# Patient Record
Sex: FEMALE | Race: WHITE | Employment: OTHER | URBAN - METROPOLITAN AREA
[De-identification: names, ages, dates, MRNs, and addresses within clinical notes are randomized per-mention and may not be internally consistent; named-entity substitution may affect disease eponyms.]

---

## 2017-12-12 ENCOUNTER — HOSPITAL ENCOUNTER (EMERGENCY)
Facility: CLINIC | Age: 77
Discharge: HOME | End: 2017-12-12
Attending: PHYSICIAN ASSISTANT | Admitting: PHYSICIAN ASSISTANT
Payer: COMMERCIAL

## 2017-12-12 VITALS — SYSTOLIC BLOOD PRESSURE: 192 MMHG | OXYGEN SATURATION: 97 % | DIASTOLIC BLOOD PRESSURE: 91 MMHG | TEMPERATURE: 98.1 F

## 2017-12-12 DIAGNOSIS — J02.0 STREP THROAT: ICD-10-CM

## 2017-12-12 DIAGNOSIS — Z20.818 EXPOSURE TO STREP THROAT: ICD-10-CM

## 2017-12-12 LAB
INTERNAL QC OK POCT: YES
S PYO AG THROAT QL IA.RAPID: POSITIVE

## 2017-12-12 PROCEDURE — 99213 OFFICE O/P EST LOW 20 MIN: CPT | Performed by: PHYSICIAN ASSISTANT

## 2017-12-12 PROCEDURE — 87880 STREP A ASSAY W/OPTIC: CPT | Performed by: PHYSICIAN ASSISTANT

## 2017-12-12 PROCEDURE — 99213 OFFICE O/P EST LOW 20 MIN: CPT

## 2017-12-12 RX ORDER — PENICILLIN V POTASSIUM 500 MG/1
500 TABLET, FILM COATED ORAL 2 TIMES DAILY
Qty: 20 TABLET | Refills: 0 | Status: SHIPPED | OUTPATIENT
Start: 2017-12-12 | End: 2017-12-22

## 2017-12-12 NOTE — ED AVS SNAPSHOT
[facility]    [address] [address]    Phone:  [phone]    Fax:  [phone]                                       Taina Jones   MRN: [id number]    Department:  [facility]   Date of Visit:  12/12/2017           After Visit Summary Signature Page     I have received my discharge instructions, and my questions have been answered. I have discussed any challenges I see with this plan with the nurse or doctor.    ..........................................................................................................................................  Patient/Patient Representative Signature      ..........................................................................................................................................  Patient Representative Print Name and Relationship to Patient    ..................................................               ................................................  Date                                            Time    ..........................................................................................................................................  Reviewed by Signature/Title    ...................................................              ..............................................  Date                                                            Time

## 2017-12-12 NOTE — ED PROVIDER NOTES
Chief Complaint    Chief Complaint   Patient presents with     Pharyngitis     exp to strep       HPI  Taina Jones is an 77 year old female. presents for evaluation and treatment of sore throat. Symptoms include congestion. Onset 1 day, unchanged since that time. Known Strep exposure: household exposure.  Her grandson was in earlier and tested positive.  Patient visits her 100+ year old mother in the nursing home daily and wants to make sure she does not have strep.    She denies any diarrhea or vomiting.  No chest pain or shortness of breath.   She is eating well with no problems swallowing.  Is urinating regularly.     ROS:  All other systems were reviewed and are negative.     Respiratory History  no history of pneumonia or bronchitis     Problem history  Patient Active Problem List   Diagnosis     Health Care Home        Allergies  Not on File     Smoking History  History   Smoking Status     Never Smoker   Smokeless Tobacco     Never Used        Current Meds  Medications reviewed in EMR    OBJECTIVE     Vital signs noted and reviewed by John Wiley  BP (!) 192/91  Temp 98.1  F (36.7  C) (Oral)  SpO2 97%     PEFR:  General appearance: healthy, alert and no distress  Ears: R TM - normal: no effusions, no erythema, and normal landmarks, L TM - normal: no effusions, no erythema, and normal landmarks  Eyes: R normal, L normal  Nose: normal  Oropharynx: moderate erythema  Neck: supple and no adenopathy  Lungs: normal and clear to auscultation  Heart: S1, S2 normal, no murmur, click, rub or gallop, regular rate and rhythm, chest is clear without rales or wheezing, no pedal edema, no JVD, no hepatosplenomegaly  Abdomen: Abdomen soft, non-tender without masses or organomegaly        Labs:     Results for orders placed or performed during the hospital encounter of 12/12/17   Rapid strep group A screen POCT   Result Value Ref Range    Rapid Strep A Screen POSITIVE neg    Internal QC OK Yes            ASSESSMENT:     (Z20.818) Exposure to strep throat    (J02.0) Strep throat  Plan: penicillin V potassium (VEETID) 500 MG tablet           PLAN:    Strep screen was positive and communicated to patient.  Rx for Penicillin today.    Symptomatic treatment with fluids, vaporizer, acetaminophen,salt water gargles, and ibuprofen.  Follow up with PCP as needed for persistence, worsening, appearance of new symptoms.  Contagion reviewed.  Out of work/school until feeling better, or no fever without antipyretics for 24 hours.        John Wiley  12/12/2017, 3:16 PM       John Wiley PA-C  12/12/17 1529

## 2017-12-12 NOTE — DISCHARGE INSTRUCTIONS
Pharyngitis: Strep (Confirmed)    You have had a positive test for strep throat. Strep throat is a contagious illness. It is spread by coughing, kissing or by touching others after touching your mouth or nose. Symptoms include throat pain that is worse with swallowing, aching all over, headache, and fever. It is treated with antibiotic medicine. This should help you start to feel better in 1 to 2 days.  Home care    Rest at home. Drink plenty of fluids to you won't get dehydrated.    No work or school for the first 2 days of taking the antibiotics. After this time, you will not be contagious. You can then return to school or work if you are feeling better.     Take antibiotic medicine for the full 10 days, even if you feel better. This is very important to ensure the infection is treated. It is also important to prevent medicine-resistant germs from developing. If you were given an antibiotic shot, you don't need any more antibiotics.    You may use acetaminophen or ibuprofen to control pain or fever, unless another medicine was prescribed for this. Talk with your doctor before taking these medicines if you have chronic liver or kidney disease. Also talk with your doctor if you have had a stomach ulcer or GI bleeding.    Throat lozenges or sprays help reduce pain. Gargling with warm saltwater will also reduce throat pain. Dissolve 1/2 teaspoon of salt in 1 glass of warm water. This may be useful just before meals.     Soft foods are OK. Avoid salty or spicy foods.  Follow-up care  Follow up with your healthcare provider or our staff if you don't get better over the next week.  When to seek medical advice  Call your healthcare provider right away if any of these occur:    Fever of 100.4 F (38 C) or higher, or as directed by your healthcare provider    New or worsening ear pain, sinus pain, or headache    Painful lumps in the back of neck    Stiff neck    Lymph nodes getting larger or becoming soft in the  middle    You can't swallow liquids or you can't open your mouth wide because of throat pain    Signs of dehydration. These include very dark urine or no urine, sunken eyes, and dizziness.    Trouble breathing or noisy breathing    Muffled voice    Rash  Date Last Reviewed: 4/13/2015 2000-2017 [employer]. [address] [place] [address], [place] [address]. All rights reserved. This information is not intended as a substitute for professional medical care. Always follow your healthcare professional's instructions.

## 2017-12-12 NOTE — ED AVS SNAPSHOT
[facility] Emergency Department    [address] [address]    Phone:  [phone]    Fax:  [phone]                                       Taina Jones   MRN: [id number]    Department:  [facility] Emergency Department   Date of Visit:  12/12/2017           Patient Information     Date Of Birth          1940        Your diagnoses for this visit were:     Exposure to strep throat     Strep throat        You were seen by John Wiley PA-C.        Discharge Instructions         Pharyngitis: Strep (Confirmed)    You have had a positive test for strep throat. Strep throat is a contagious illness. It is spread by coughing, kissing or by touching others after touching your mouth or nose. Symptoms include throat pain that is worse with swallowing, aching all over, headache, and fever. It is treated with antibiotic medicine. This should help you start to feel better in 1 to 2 days.  Home care    Rest at home. Drink plenty of fluids to you won't get dehydrated.    No work or school for the first 2 days of taking the antibiotics. After this time, you will not be contagious. You can then return to school or work if you are feeling better.     Take antibiotic medicine for the full 10 days, even if you feel better. This is very important to ensure the infection is treated. It is also important to prevent medicine-resistant germs from developing. If you were given an antibiotic shot, you don't need any more antibiotics.    You may use acetaminophen or ibuprofen to control pain or fever, unless another medicine was prescribed for this. Talk with your doctor before taking these medicines if you have chronic liver or kidney disease. Also talk with your doctor if you have had a stomach ulcer or GI bleeding.    Throat lozenges or sprays help reduce pain. Gargling with warm saltwater will also reduce throat pain. Dissolve 1/2 teaspoon of salt in 1 glass of warm water. This may be useful  just before meals.     Soft foods are OK. Avoid salty or spicy foods.  Follow-up care  Follow up with your healthcare provider or our staff if you don't get better over the next week.  When to seek medical advice  Call your healthcare provider right away if any of these occur:    Fever of 100.4 F (38 C) or higher, or as directed by your healthcare provider    New or worsening ear pain, sinus pain, or headache    Painful lumps in the back of neck    Stiff neck    Lymph nodes getting larger or becoming soft in the middle    You can't swallow liquids or you can't open your mouth wide because of throat pain    Signs of dehydration. These include very dark urine or no urine, sunken eyes, and dizziness.    Trouble breathing or noisy breathing    Muffled voice    Rash  Date Last Reviewed: 4/13/2015 2000-2017 [employer]. [address] [place] [address]. All rights reserved. This information is not intended as a substitute for professional medical care. Always follow your healthcare professional's instructions.          24 Hour Appointment Hotline       To make an appointment at any [facility], call [phone] ([phone]). If you don't have a family doctor or clinic, we will help you find one. [facility] are conveniently located to serve the needs of you and your family.             Review of your medicines      START taking        Dose / Directions Last dose taken    penicillin V potassium 500 MG tablet   Commonly known as:  VEETID   Dose:  500 mg   Quantity:  20 tablet        Take 1 tablet (500 mg) by mouth 2 times daily for 10 days   Refills:  0                Prescriptions were sent or printed at these locations (1 Prescription)                   [facility] [address] [address]    Telephone:  [phone]   Fax:  [phone]   Hours:                  E-Prescribed (1 of 1)         penicillin V potassium  "(VEETID) 500 MG tablet                Procedures and tests performed during your visit     Rapid strep group A screen POCT      Orders Needing Specimen Collection     None      Pending Results     No orders found from 12/10/2017 to 2017.            Pending Culture Results     No orders found from 12/10/2017 to 2017.            Pending Results Instructions     If you had any lab results that were not finalized at the time of your Discharge, you can call the ED Lab Result RN at [phone]. You will be contacted by this team for any positive Lab results or changes in treatment. The nurses are available 7 days a week from 10A to 6:30P.  You can leave a message 24 hours per day and they will return your call.        Test Results From Your Hospital Stay        2017  3:27 PM      Component Results     Component Value Ref Range & Units Status    Rapid Strep A Screen POSITIVE neg Final    Internal QC OK Yes  Final                Thank you for choosing [facility]       Thank you for choosing [facility] for your care. Our goal is always to provide you with excellent care. Hearing back from our patients is one way we can continue to improve our services. Please take a few minutes to complete the written survey that you may receive in the mail after you visit with us. Thank you!        [employer] Information     [employer] lets you send messages to your doctor, view your test results, renew your prescriptions, schedule appointments and more. To sign up, go to [email or web address] . Click on \"Log in\" on the left side of the screen, which will take you to the Welcome page. Then click on \"Sign up Now\" on the right side of the page.     You will be asked to enter the access code listed below, as well as some personal information. Please follow the directions to create your username and password.     Your access code is: [email or web address]  Expires: 3/12/2018  3:39 PM     Your access code will  in 90 days. If you need " help or a new code, please call your [facility] clinic or [phone].        Care EveryWhere ID     This is your Care EveryWhere ID. This could be used by other organizations to access your [facility] medical records  [id number]        Equal Access to Services     PETER ELIZABETH : Eileen Colby, fernanda brink, yuridia kaalcasey singh, lavell houston. So [facility] [phone].    ATENCIÓN: Si habla español, tiene a quintana disposición servicios gratuitos de asistencia lingüística. Llame al [phone].    We comply with applicable federal civil rights laws and [place] laws. We do not discriminate on the basis of race, color, national origin, age, disability, sex, sexual orientation, or gender identity.            After Visit Summary       This is your record. Keep this with you and show to your community pharmacist(s) and doctor(s) at your next visit.

## 2022-09-21 ENCOUNTER — OFFICE VISIT (OUTPATIENT)
Dept: FAMILY MEDICINE | Facility: CLINIC | Age: 82
End: 2022-09-21
Payer: COMMERCIAL

## 2022-09-21 VITALS
WEIGHT: 113.8 LBS | BODY MASS INDEX: 21.49 KG/M2 | DIASTOLIC BLOOD PRESSURE: 52 MMHG | HEART RATE: 75 BPM | OXYGEN SATURATION: 99 % | TEMPERATURE: 98.6 F | RESPIRATION RATE: 16 BRPM | HEIGHT: 61 IN | SYSTOLIC BLOOD PRESSURE: 182 MMHG

## 2022-09-21 DIAGNOSIS — I10 BENIGN ESSENTIAL HYPERTENSION: ICD-10-CM

## 2022-09-21 DIAGNOSIS — Z01.818 PREOP GENERAL PHYSICAL EXAM: Primary | ICD-10-CM

## 2022-09-21 DIAGNOSIS — H26.9 CATARACT OF BOTH EYES, UNSPECIFIED CATARACT TYPE: ICD-10-CM

## 2022-09-21 LAB
ANION GAP SERPL CALCULATED.3IONS-SCNC: 9 MMOL/L (ref 7–15)
BUN SERPL-MCNC: 23.8 MG/DL (ref 8–23)
CALCIUM SERPL-MCNC: 9 MG/DL (ref 8.8–10.2)
CHLORIDE SERPL-SCNC: 105 MMOL/L (ref 98–107)
CREAT SERPL-MCNC: 0.84 MG/DL (ref 0.51–0.95)
DEPRECATED HCO3 PLAS-SCNC: 26 MMOL/L (ref 22–29)
GFR SERPL CREATININE-BSD FRML MDRD: 69 ML/MIN/1.73M2
GLUCOSE SERPL-MCNC: 94 MG/DL (ref 70–99)
POTASSIUM SERPL-SCNC: 4.1 MMOL/L (ref 3.4–5.3)
SODIUM SERPL-SCNC: 140 MMOL/L (ref 136–145)
TSH SERPL DL<=0.005 MIU/L-ACNC: 0.9 UIU/ML (ref 0.3–4.2)

## 2022-09-21 PROCEDURE — 99204 OFFICE O/P NEW MOD 45 MIN: CPT | Performed by: FAMILY MEDICINE

## 2022-09-21 PROCEDURE — 36415 COLL VENOUS BLD VENIPUNCTURE: CPT | Performed by: FAMILY MEDICINE

## 2022-09-21 PROCEDURE — 84443 ASSAY THYROID STIM HORMONE: CPT | Performed by: FAMILY MEDICINE

## 2022-09-21 PROCEDURE — 80048 BASIC METABOLIC PNL TOTAL CA: CPT | Performed by: FAMILY MEDICINE

## 2022-09-21 RX ORDER — LISINOPRIL AND HYDROCHLOROTHIAZIDE 20; 25 MG/1; MG/1
1 TABLET ORAL DAILY
Qty: 30 TABLET | Refills: 1 | Status: SHIPPED | OUTPATIENT
Start: 2022-09-21 | End: 2022-09-26

## 2022-09-21 NOTE — PROGRESS NOTES
[facility]  [address] [address]  Phone: [phone]  Primary Provider: No Ref-Primary, Physician  Pre-op Performing Provider: ISAC FAROOQ    6}  PREOPERATIVE EVALUATION:  Today's date: 9/21/2022    Taina Jones is a 82 year old female who presents for a preoperative evaluation.    Surgical Information:  Surgery/Procedure: Phacoemulsification/Intraocular Lens, right  [facility]  Surgeon: Dr. Dejesus  Surgery Date: 9/28/22, 10/12/22  Time of Surgery: TBD  Where patient plans to recover: At home with family  Fax number for surgical facility: [phone]    Type of Anesthesia Anticipated: to be determined    Assessment & Plan     The proposed surgical procedure is considered LOW risk.    Preop general physical exam  Approval given - Blood pressure improved after medication was started.     Benign essential hypertension  Blood pressure quite elevated. Recommend recheck in a few days. Medication started on patient.  - lisinopril-hydrochlorothiazide (ZESTORETIC) 20-25 MG tablet; Take 1 tablet by mouth daily  - Basic metabolic panel  (Ca, Cl, CO2, Creat, Gluc, K, Na, BUN)  - TSH with free T4 reflex    Cataract of both eyes, unspecified cataract type  Patient cleared for surgery.          Risks and Recommendations:  The patient has the following additional risks and recommendations for perioperative complications:   - No identified additional risk factors other than previously addressed    Medication Instructions:  Patient is on no chronic medications    RECOMMENDATION:  APPROVAL Given  - blood pressure was quite elevated.   Needs to come back for blood pressure recheck.    Patient came in for blood pressure recheck on 9/26/2022  Blood pressure improved with medication.   Spoke with ophthalmologist who said the blood pressure is okay to proceed with surgery  Cleared for surgery.     15  minutes spent on  the date of the encounter doing patient visit and documentation         Subjective     HPI related to upcoming procedure:   Patient is an 82 yr old female here for a pre op evaluation. She is having cataract surgery in the next few weeks. She has not really received any medical care for a number of years. She states that she is not aware of any chronic medical conditions that she may have.  Patient's blood pressure was quite elevated today and it appears that she may have had hypertension for a while. She denies any chest pain or shortness of breath.     Preop Questions 9/21/2022   1. Have you ever had a heart attack or stroke? No   2. Have you ever had surgery on your heart or blood vessels, such as a stent placement, a coronary artery bypass, or surgery on an artery in your head, neck, heart, or legs? No   3. Do you have chest pain with activity? No   4. Do you have a history of  heart failure? No   5. Do you currently have a cold, bronchitis or symptoms of other infection? No   6. Do you have a cough, shortness of breath, or wheezing? No   7. Do you or anyone in your family have previous history of blood clots? No   8. Do you or does anyone in your family have a serious bleeding problem such as prolonged bleeding following surgeries or cuts? No   9. Have you ever had problems with anemia or been told to take iron pills? No   10. Have you had any abnormal blood loss such as black, tarry or bloody stools, or abnormal vaginal bleeding? No   11. Have you ever had a blood transfusion? UNKNOWN -    12. Are you willing to have a blood transfusion if it is medically needed before, during, or after your surgery? Yes   13. Have you or any of your relatives ever had problems with anesthesia? No   14. Do you have sleep apnea, excessive snoring or daytime drowsiness? No   15. Do you have any artifical heart valves or other implanted medical devices like a pacemaker, defibrillator, or continuous glucose monitor? No   16. Do  you have artificial joints? No   17. Are you allergic to latex? No       Health Care Directive:  Patient does not have a Health Care Directive or Living Will: Discussed advance care planning with patient; however, patient declined at this time.    Preoperative Review of :   reviewed - no record of controlled substances prescribed.    Status of Chronic Conditions:  See problem list for active medical problems.  Problems all longstanding and stable, except as noted/documented.  See ROS for pertinent symptoms related to these conditions.      Review of Systems  CONSTITUTIONAL: NEGATIVE for fever, chills, change in weight  INTEGUMENTARY/SKIN: NEGATIVE for worrisome rashes, moles or lesions  EYES: POSITIVE for blurred vision bilateral  ENT/MOUTH: NEGATIVE for ear, mouth and throat problems  RESP: NEGATIVE for significant cough or SOB  CV: NEGATIVE for chest pain, palpitations or peripheral edema  GI: NEGATIVE for nausea, abdominal pain, heartburn, or change in bowel habits  : NEGATIVE for frequency, dysuria, or hematuria  MUSCULOSKELETAL: NEGATIVE for significant arthralgias or myalgia  NEURO: NEGATIVE for weakness, dizziness or paresthesias  ENDOCRINE: NEGATIVE for temperature intolerance, skin/hair changes  HEME: NEGATIVE for bleeding problems  PSYCHIATRIC: NEGATIVE for changes in mood or affect    Patient Active Problem List    Diagnosis Date Noted     Health Care Home 12/17/2012     Priority: Medium     Alyssa Houston RN-PHN  FPA / [facility]   [phone]    DX V65.8 REPLACED WITH [id number] HEALTH CARE HOME (04/08/2013)        History reviewed. No pertinent past medical history.  History reviewed. No pertinent surgical history.  Current Outpatient Medications   Medication Sig Dispense Refill     Aspirin-Acetaminophen-Caffeine (EXCEDRIN PO)        lisinopril-hydrochlorothiazide (ZESTORETIC) 20-25 MG tablet Take 1 tablet by mouth daily 30 tablet 1     Triprolidine-Pseudoephedrine (ACTIFED  "PO)          No Known Allergies     Social History     Tobacco Use     Smoking status: Former Smoker     Smokeless tobacco: Never Used   Substance Use Topics     Alcohol use: Yes     Comment: occasional 4-5 drinks per month     Family History   Problem Relation Age of Onset     Liver Disease Father      Substance Abuse Maternal Grandfather      Cancer Paternal Grandmother      Cancer Paternal Grandfather      Cancer Brother      Diabetes Sister      Lupus Sister      Breast Cancer Daughter      History   Drug Use No         Objective     BP (!) 182/52   Pulse 75   Temp 98.6  F (37  C) (Tympanic)   Resp 16   Ht 1.549 m (5' 1\")   Wt 51.6 kg (113 lb 12.8 oz)   SpO2 99%   BMI 21.50 kg/m      Physical Exam    GENERAL APPEARANCE: healthy, alert and no distress     EYES: EOMI, PERRL     HENT: ear canals and TM's normal and nose and mouth without ulcers or lesions     NECK: no adenopathy, no asymmetry, masses, or scars and thyroid normal to palpation     RESP: lungs clear to auscultation - no rales, rhonchi or wheezes     CV: regular rates and rhythm, normal S1 S2, no S3 or S4 and no murmur, click or rub     ABDOMEN:  soft, nontender, no HSM or masses and bowel sounds normal     MS: extremities normal- no gross deformities noted, no evidence of inflammation in joints, FROM in all extremities.     SKIN: no suspicious lesions or rashes     PSYCH: mentation appears normal. and affect normal/bright     LYMPHATICS: No cervical adenopathy    No results for input(s): HGB, PLT, INR, NA, POTASSIUM, CR, A1C in the last 21348 hours.     Diagnostics:  Labs pending at this time.  Results will be reviewed when available.   No EKG required for low risk surgery (cataract, skin procedure, breast biopsy, etc).    Revised Cardiac Risk Index (RCRI):  The patient has the following serious cardiovascular risks for perioperative complications:   - No serious cardiac risks = 0 points     RCRI Interpretation: 0 points: Class I (very low risk " - 0.4% complication rate)           Signed Electronically by: Glen Santamaria MD  Copy of this evaluation report is provided to requesting physician.

## 2022-09-21 NOTE — PATIENT INSTRUCTIONS
Preparing for Your Surgery  Getting started  A nurse will call you to review your health history and instructions. They will give you an arrival time based on your scheduled surgery time. Please be ready to share:    Your doctor's clinic name and phone number    Your medical, surgical and anesthesia history    A list of allergies and sensitivities    A list of medicines, including herbal treatments and over-the-counter drugs    Whether the patient has a legal guardian (ask how to send us the papers in advance)  Please tell us if you're pregnant--or if there's any chance you might be pregnant. Some surgeries may injure a fetus (unborn baby), so they require a pregnancy test. Surgeries that are safe for a fetus don't always need a test, and you can choose whether to have one.   If you have a child who's having surgery, please ask for a copy of Preparing for Your Child's Surgery.    Preparing for surgery    Within 30 days of surgery: Have a pre-op exam (sometimes called an H&P, or History and Physical). This can be done at a clinic or pre-operative center.  ? If you're having a , you may not need this exam. Talk to your care team.    At your pre-op exam, talk to your care team about all medicines you take. If you need to stop any medicines before surgery, ask when to start taking them again.  ? We do this for your safety. Many medicines can make you bleed too much during surgery. Some change how well surgery (anesthesia) drugs work.    Call your insurance company to let them know you're having surgery. (If you don't have insurance, call [phone].)    Call your clinic if there's any change in your health. This includes signs of a cold or flu (sore throat, runny nose, cough, rash, fever). It also includes a scrape or scratch near the surgery site.    If you have questions on the day of surgery, call your hospital or surgery center.  COVID testing  You may need to be tested for COVID-19 before having  surgery. If so, we will give you instructions.  Eating and drinking guidelines  For your safety: Unless your surgeon tells you otherwise, follow the guidelines below.    Eat and drink as usual until 8 hours before surgery. After that, no food or milk.    Drink clear liquids until 2 hours before surgery. These are liquids you can see through, like water, Gatorade and Propel Water. You may also have black coffee and tea (no cream or milk).    Nothing by mouth within 2 hours of surgery. This includes gum, candy and breath mints.    If you drink alcohol: Stop drinking it the night before surgery.    If your care team tells you to take medicine on the morning of surgery, it's okay to take it with a sip of water.  Preventing infection    Shower or bathe the night before and morning of your surgery. Follow the instructions your clinic gave you. (If no instructions, use regular soap.)    Don't shave or clip hair near your surgery site. We'll remove the hair if needed.    Don't smoke or vape the morning of surgery. You may chew nicotine gum up to 2 hours before surgery. A nicotine patch is okay.  ? Note: Some surgeries require you to completely quit smoking and nicotine. Check with your surgeon.    Your care team will make every effort to keep you safe from infection. We will:  ? Clean our hands often with soap and water (or an alcohol-based hand rub).  ? Clean the skin at your surgery site with a special soap that kills germs.  ? Give you a special gown to keep you warm. (Cold raises the risk of infection.)  ? Wear special hair covers, masks, gowns and gloves during surgery.  ? Give antibiotic medicine, if prescribed. Not all surgeries need antibiotics.  What to bring on the day of surgery    Photo ID and insurance card    Copy of your health care directive, if you have one    Glasses and hearing aides (bring cases)  ? You can't wear contacts during surgery    Inhaler and eye drops, if you use them (tell us about these when  you arrive)    CPAP machine or breathing device, if you use them    A few personal items, if spending the night    If you have . . .  ? A pacemaker, ICD (cardiac defibrillator) or other implant: Bring the ID card.  ? An implanted stimulator: Bring the remote control.  ? A legal guardian: Bring a copy of the certified (court-stamped) guardianship papers.  Please remove any jewelry, including body piercings. Leave jewelry and other valuables at home.  If you're going home the day of surgery    You must have a responsible adult drive you home. They should stay with you overnight as well.    If you don't have someone to stay with you, and you aren't safe to go home alone, we may keep you overnight. Insurance often won't pay for this.  After surgery  If it's hard to control your pain or you need more pain medicine, please call your surgeon's office.  Questions?   If you have any questions for your care team, list them here: _________________________________________________________________________________________________________________________________________________________________________ ____________________________________ ____________________________________ ____________________________________  For informational purposes only. Not to replace the advice of your health care provider. Copyright   2003, 2019 [facility]. All rights reserved. Clinically reviewed by Lucero Fritz MD. [employer] [id number] - REV 07/21.

## 2022-09-21 NOTE — RESULT ENCOUNTER NOTE
Please inform patient that test result was within normal parameters.   Thank you.     Glen Santamaria M.D.

## 2022-09-26 ENCOUNTER — OFFICE VISIT (OUTPATIENT)
Dept: FAMILY MEDICINE | Facility: CLINIC | Age: 82
End: 2022-09-26
Payer: COMMERCIAL

## 2022-09-26 VITALS
RESPIRATION RATE: 16 BRPM | TEMPERATURE: 98.3 F | BODY MASS INDEX: 21.39 KG/M2 | SYSTOLIC BLOOD PRESSURE: 142 MMHG | HEART RATE: 72 BPM | WEIGHT: 113.2 LBS | DIASTOLIC BLOOD PRESSURE: 60 MMHG

## 2022-09-26 DIAGNOSIS — I10 BENIGN ESSENTIAL HYPERTENSION: ICD-10-CM

## 2022-09-26 PROCEDURE — 99213 OFFICE O/P EST LOW 20 MIN: CPT | Performed by: FAMILY MEDICINE

## 2022-09-26 RX ORDER — LISINOPRIL AND HYDROCHLOROTHIAZIDE 20; 25 MG/1; MG/1
2 TABLET ORAL DAILY
Qty: 180 TABLET | Refills: 0 | Status: SHIPPED | OUTPATIENT
Start: 2022-09-26 | End: 2022-11-28

## 2022-09-26 ASSESSMENT — PAIN SCALES - GENERAL: PAINLEVEL: NO PAIN (0)

## 2022-09-26 NOTE — PROGRESS NOTES
Assessment & Plan     Benign essential hypertension  Medication refilled. Asked to increase to two a day. Follow up in two weeks for a recheck  - lisinopril-hydrochlorothiazide (ZESTORETIC) 20-25 MG tablet; Take 2 tablets by mouth daily for 90 days    :[id number]}     FUTURE APPOINTMENTS:       - Follow-up visit in 2 weeks for BP recheck.    Return in about 2 weeks (around 10/10/2022) for Follow up.    Glen Santamaria MD  [facility]    South Her is a 82 year old accompanied by her self, presenting for the following health issues:    82 yr old female here for recheck of hypertension. Was seen for a pre op last week and was found to have elevated blood pressure. She was started on medication at the time and she says she has has no side effects to the medication. Her blood pressure is still slightly high. Called her surgeon and he reports that she can proceed with her surgery   Recheck Medication      History of Present Illness       Hypertension: She presents for follow up of hypertension.  She does not check blood pressure  regularly outside of the clinic. Outside blood pressures have been over 140/90. She does not follow a low salt diet.           Review of Systems   CONSTITUTIONAL: NEGATIVE for fever, chills, change in weight  INTEGUMENTARY/SKIN: NEGATIVE for worrisome rashes, moles or lesions  ENT/MOUTH: NEGATIVE for ear, mouth and throat problems  RESP: NEGATIVE for significant cough or SOB  CV: NEGATIVE for chest pain, palpitations or peripheral edema  GI: NEGATIVE for nausea, abdominal pain, heartburn, or change in bowel habits  NEURO: NEGATIVE for weakness, dizziness or paresthesias  HEME/ALLERGY/IMMUNE: NEGATIVE for bleeding problems  PSYCHIATRIC: NEGATIVE for changes in mood or affect      Objective    BP (!) 142/60   Pulse 72   Temp 98.3  F (36.8  C) (Tympanic)   Resp 16   Wt 51.3 kg (113 lb 3.2 oz)   BMI 21.39 kg/m    Body mass index is 21.39 kg/m .  Physical  Exam   GENERAL: healthy, alert and no distress  EYES: Eyes grossly normal to inspection, PERRL and conjunctivae and sclerae normal  HENT: ear canals and TM's normal, nose and mouth without ulcers or lesions  NECK: no adenopathy, no asymmetry, masses, or scars and thyroid normal to palpation  RESP: lungs clear to auscultation - no rales, rhonchi or wheezes  CV: regular rate and rhythm, normal S1 S2, no S3 or S4, no murmur, click or rub, no peripheral edema and peripheral pulses strong  ABDOMEN: soft, nontender, no hepatosplenomegaly, no masses and bowel sounds normal  MS: no gross musculoskeletal defects noted, no edema

## 2022-11-28 ENCOUNTER — OFFICE VISIT (OUTPATIENT)
Dept: FAMILY MEDICINE | Facility: CLINIC | Age: 82
End: 2022-11-28
Payer: COMMERCIAL

## 2022-11-28 VITALS
RESPIRATION RATE: 16 BRPM | BODY MASS INDEX: 20.92 KG/M2 | TEMPERATURE: 98.5 F | SYSTOLIC BLOOD PRESSURE: 150 MMHG | HEIGHT: 61 IN | OXYGEN SATURATION: 99 % | HEART RATE: 85 BPM | WEIGHT: 110.8 LBS | DIASTOLIC BLOOD PRESSURE: 70 MMHG

## 2022-11-28 DIAGNOSIS — I10 BENIGN ESSENTIAL HYPERTENSION: ICD-10-CM

## 2022-11-28 PROCEDURE — 93000 ELECTROCARDIOGRAM COMPLETE: CPT | Performed by: STUDENT IN AN ORGANIZED HEALTH CARE EDUCATION/TRAINING PROGRAM

## 2022-11-28 PROCEDURE — 99214 OFFICE O/P EST MOD 30 MIN: CPT | Performed by: STUDENT IN AN ORGANIZED HEALTH CARE EDUCATION/TRAINING PROGRAM

## 2022-11-28 RX ORDER — LISINOPRIL AND HYDROCHLOROTHIAZIDE 20; 25 MG/1; MG/1
2 TABLET ORAL DAILY
Qty: 180 TABLET | Refills: 1 | Status: SHIPPED | OUTPATIENT
Start: 2022-11-28 | End: 2023-06-27

## 2022-11-28 RX ORDER — AMLODIPINE BESYLATE 5 MG/1
5 TABLET ORAL DAILY
Qty: 90 TABLET | Refills: 3 | Status: SHIPPED | OUTPATIENT
Start: 2022-11-28 | End: 2023-11-14

## 2022-11-28 ASSESSMENT — ENCOUNTER SYMPTOMS
ABDOMINAL PAIN: 0
NECK PAIN: 0
HEADACHES: 0
EYE PAIN: 0
SINUS PAIN: 0
COUGH: 0
FEVER: 0
DYSURIA: 0

## 2022-11-28 ASSESSMENT — PAIN SCALES - GENERAL: PAINLEVEL: NO PAIN (0)

## 2022-11-28 NOTE — PATIENT INSTRUCTIONS
Hello! It was a pleasure seeing you today. Just some things we discussed at today's visit:     Blood Pressure    We will add an additional medication called Amlodipine 5mg by mouth daily to help lower your blood pressure    If at any time you experience chest pain, palpitations, shortness of breath, vision changes, and/or painful headaches check your blood pressure if the value is higher than 180/110 go to the ER or Urgent Care        Sincerely,     Francesca Mullen MD

## 2022-11-28 NOTE — PROGRESS NOTES
1. Benign essential hypertension, not at goal of <130/80   > blood pressure in clinic was 150/53 and 150/70 respectively   > EKG 12-lead complete w/read showed NSR, no QT prolongation or ST segment abnormalities   > based on results of previous TSH and BMP, patient unlikely to have thyroid or aldosterone abnormalities given normal potassium and sodium values  -Patient states that her blood pressure at home is still high with SBP in the 160s  -refilled lisinopril-hydrochlorothiazide (ZESTORETIC) 20-25 MG tablet; Take 2 tablets by mouth daily  Dispense: 180 tablet; Refill: 1  -Since the patient is maxed out on lisinopril and hydrochlorothiazide, plan to start amLODIPine (NORVASC) 5 MG tablet; Take 1 tablet (5 mg) by mouth daily  Dispense: 90 tablet; Refill: 3  - ED precautions given for hypertensive urgency/emergency     Follow up plan:   - return to clinic in 2-3 weeks for nurse visit recheck   -If her blood pressure continue to remain elevated despite being on 3 antihypertensives, increase the dose of her amlodipine and consider evaluating for secondary hypertension such as renal artery stenosis; as mentioned above there is a lower suspicion for hyperaldosteronism given her normal electrolytes and hyperthyroidism given her normal TSH values     Francesca Mullen MD       [place] Her is a 82 year old, presenting for the following health issues:  Hypertension and Recheck Medication      HPI     Hypertension Follow-up      Do you check your blood pressure regularly outside of the clinic? Yes     Are you following a low salt diet? Yes    Are your blood pressures ever more than 140 on the top number (systolic) OR more   than 90 on the bottom number (diastolic), for example 140/90? Yes 170/90 has been the highest that she remembers      How many servings of fruits and vegetables do you eat daily?  2-3    On average, how many sweetened beverages do you drink each day (Examples: soda, juice, sweet tea, etc.  Do NOT  "count diet or artificially sweetened beverages)?   0    How many days per week do you exercise enough to make your heart beat faster? 3 or less    How many minutes a day do you exercise enough to make your heart beat faster? 9 or less    How many days per week do you miss taking your medication? 0    Medication Followup of lisinopril-hydrochlorothiazide 20-25mg    Taking Medication as prescribed: yes    Side Effects:  First she got really tired and some itchy skin. The tiredness seems to be getting better, the itchy skin is mainly on her back    Medication Helping Symptoms:  NO-still having high blood pressures      Review of Systems   Constitutional: Negative for fever.   HENT: Negative for ear pain and sinus pain.    Eyes: Negative for pain.   Respiratory: Negative for cough.    Cardiovascular: Negative for chest pain.   Gastrointestinal: Negative for abdominal pain.   Genitourinary: Negative for dysuria.   Musculoskeletal: Negative for neck pain.   Skin: Negative for rash.   Neurological: Negative for headaches.          Objective    BP (!) 150/54 (BP Location: Right arm, Patient Position: Sitting, Cuff Size: Adult Regular)   Pulse 85   Temp 98.5  F (36.9  C) (Tympanic)   Resp 16   Ht 1.549 m (5' 1\")   Wt 50.3 kg (110 lb 12.8 oz)   LMP  (LMP Unknown)   SpO2 99%   Breastfeeding No   BMI 20.94 kg/m    Body mass index is 20.94 kg/m .  Physical Exam  Constitutional:       General: She is not in acute distress.  HENT:      Head: Normocephalic and atraumatic.      Right Ear: External ear normal.      Left Ear: External ear normal.      Mouth/Throat:      Mouth: Mucous membranes are moist.      Pharynx: Oropharynx is clear. No oropharyngeal exudate or posterior oropharyngeal erythema.   Eyes:      Extraocular Movements: Extraocular movements intact.   Cardiovascular:      Rate and Rhythm: Regular rhythm.      Heart sounds: Normal heart sounds.      Comments: Although patient has a history of mitral valve " prolapse, I did not appreciate a murmur on my exam, however I did notice that her pulse felt somewhat irregular, she would have a steady and predictable rhythm for roughly 11-13 beats and then it felt like her pulse would stop for longer than anticipated before going back to a regular beat  Repeat blood pressure was 150/70  Pulmonary:      Effort: Pulmonary effort is normal. No respiratory distress.      Breath sounds: Normal breath sounds. No wheezing or rhonchi.   Abdominal:      Palpations: Abdomen is soft. There is no mass.      Tenderness: There is no abdominal tenderness.   Musculoskeletal:         General: No deformity. Normal range of motion.      Cervical back: Normal range of motion and neck supple.   Skin:     General: Skin is warm.      Findings: No rash.   Neurological:      General: No focal deficit present.      Mental Status: She is alert and oriented to person, place, and time.   Psychiatric:         Mood and Affect: Mood normal.            Office Visit on 09/21/2022   Component Date Value Ref Range Status     Sodium 09/21/2022 140  136 - 145 mmol/L Final     Potassium 09/21/2022 4.1  3.4 - 5.3 mmol/L Final     Chloride 09/21/2022 105  98 - 107 mmol/L Final     Carbon Dioxide (CO2) 09/21/2022 26  22 - 29 mmol/L Final     Anion Gap 09/21/2022 9  7 - 15 mmol/L Final     Urea Nitrogen 09/21/2022 23.8 (H)  8.0 - 23.0 mg/dL Final     Creatinine 09/21/2022 0.84  0.51 - 0.95 mg/dL Final     Calcium 09/21/2022 9.0  8.8 - 10.2 mg/dL Final     Glucose 09/21/2022 94  70 - 99 mg/dL Final     GFR Estimate 09/21/2022 69  >60 mL/min/1.73m2 Final    Effective December 21, 2021 eGFRcr in adults is calculated using the 2021 CKD-EPI creatinine equation which includes age and gender (Jennie et al., NEJ, DOI: 10.1056/BPWRar5761921)     TSH 09/21/2022 0.90  0.30 - 4.20 uIU/mL Final

## 2022-12-12 ENCOUNTER — ALLIED HEALTH/NURSE VISIT (OUTPATIENT)
Dept: FAMILY MEDICINE | Facility: CLINIC | Age: 82
End: 2022-12-12
Payer: COMMERCIAL

## 2022-12-12 ENCOUNTER — TELEPHONE (OUTPATIENT)
Dept: FAMILY MEDICINE | Facility: CLINIC | Age: 82
End: 2022-12-12

## 2022-12-12 VITALS — DIASTOLIC BLOOD PRESSURE: 50 MMHG | SYSTOLIC BLOOD PRESSURE: 134 MMHG | HEART RATE: 76 BPM

## 2022-12-12 DIAGNOSIS — I10 BENIGN ESSENTIAL HYPERTENSION: Primary | ICD-10-CM

## 2022-12-12 PROCEDURE — 99207 PR NO CHARGE NURSE ONLY: CPT

## 2022-12-12 NOTE — PROGRESS NOTES
Taina Jones is a 82 year old patient who comes in today for a Blood Pressure check because of medication change.   Amlodipine added to regimen.  Also takes lisinopril-hydrochlorothiazide.   Vital Signs as repeated by RN today:  /56 initially, then 134/50 about 10 minutes later. Pulse 76.   Patient is taking medication as prescribed.  Patient is tolerating medications well.  Patient is monitoring Blood Pressure at home.  Average readings if yes are 150's/80's.   Current complaints: none, and no edema noted today.   Disposition:  patient to continue with the same medication.    Alisa Arizmendi RN  [facility]

## 2022-12-12 NOTE — TELEPHONE ENCOUNTER
Taina Jones is a 82 year old patient who comes in today for a Blood Pressure check because of medication change.   Amlodipine added to regimen.  Also takes lisinopril-hydrochlorothiazide.   Vital Signs as repeated by RN today:  /56 initially, then 134/50 about 10 minutes later. Pulse 76.   Patient is taking medication as prescribed.  Patient is tolerating medications well.  Patient is monitoring Blood Pressure at home.  Average readings if yes are 150's/80's.   Current complaints: none, and no edema noted today.   Disposition:  patient to continue with the same medication.    Alisa Arizmendi RN  [facility]

## 2022-12-19 NOTE — TELEPHONE ENCOUNTER
Per routing history, RN notes this encounter was opened and marked as done by Dr. Mullen on 12/13/22, so closing encounter.     Alisa Arizmendi RN  [facility]

## 2023-06-25 DIAGNOSIS — I10 BENIGN ESSENTIAL HYPERTENSION: ICD-10-CM

## 2023-06-27 RX ORDER — LISINOPRIL AND HYDROCHLOROTHIAZIDE 20; 25 MG/1; MG/1
2 TABLET ORAL DAILY
Qty: 180 TABLET | Refills: 0 | Status: SHIPPED | OUTPATIENT
Start: 2023-06-27 | End: 2023-10-03

## 2023-06-27 NOTE — TELEPHONE ENCOUNTER
"Requested Prescriptions   Pending Prescriptions Disp Refills     lisinopril-hydrochlorothiazide (ZESTORETIC) 20-25 MG tablet [Pharmacy Med Name: lisinopril 20 mg-hydrochlorothiazide 25 mg tablet] 180 tablet 1     Sig: Take 2 tablets by mouth daily       Diuretics (Including Combos) Protocol Passed - 6/25/2023  1:44 PM        Passed - Blood pressure under 140/90 in past 12 months     BP Readings from Last 3 Encounters:   12/12/22 134/50   11/28/22 (!) 150/70   09/26/22 (!) 142/60                 Passed - Recent (12 mo) or future (30 days) visit within the authorizing provider's specialty     Patient has had an office visit with the authorizing provider or a provider within the authorizing providers department within the previous 12 mos or has a future within next 30 days. See \"Patient Info\" tab in inbasket, or \"Choose Columns\" in Meds & Orders section of the refill encounter.              Passed - Medication is active on med list        Passed - Patient is age 18 or older        Passed - No active pregancy on record        Passed - Normal serum creatinine on file in past 12 months     Recent Labs   Lab Test 09/21/22  1036   CR 0.84              Passed - Normal serum potassium on file in past 12 months     Recent Labs   Lab Test 09/21/22  1036   POTASSIUM 4.1                    Passed - Normal serum sodium on file in past 12 months     Recent Labs   Lab Test 09/21/22  1036                 Passed - No positive pregnancy test in past 12 months       ACE Inhibitors (Including Combos) Protocol Passed - 6/25/2023  1:44 PM        Passed - Blood pressure under 140/90 in past 12 months     BP Readings from Last 3 Encounters:   12/12/22 134/50   11/28/22 (!) 150/70   09/26/22 (!) 142/60                 Passed - Recent (12 mo) or future (30 days) visit within the authorizing provider's specialty     Patient has had an office visit with the authorizing provider or a provider within the authorizing providers department " "within the previous 12 mos or has a future within next 30 days. See \"Patient Info\" tab in inbasket, or \"Choose Columns\" in Meds & Orders section of the refill encounter.              Passed - Medication is active on med list        Passed - Patient is age 18 or older        Passed - No active pregnancy on record        Passed - Normal serum creatinine on file in past 12 months     Recent Labs   Lab Test 09/21/22  1036   CR 0.84       Ok to refill medication if creatinine is low          Passed - Normal serum potassium on file in past 12 months     Recent Labs   Lab Test 09/21/22  1036   POTASSIUM 4.1             Passed - No positive pregnancy test within past 12 months             "

## 2023-10-03 DIAGNOSIS — I10 BENIGN ESSENTIAL HYPERTENSION: ICD-10-CM

## 2023-10-03 RX ORDER — LISINOPRIL AND HYDROCHLOROTHIAZIDE 20; 25 MG/1; MG/1
2 TABLET ORAL DAILY
Qty: 180 TABLET | Refills: 0 | Status: SHIPPED | OUTPATIENT
Start: 2023-10-03 | End: 2024-01-04

## 2023-12-19 ENCOUNTER — ALLIED HEALTH/NURSE VISIT (OUTPATIENT)
Dept: FAMILY MEDICINE | Facility: CLINIC | Age: 83
End: 2023-12-19
Payer: COMMERCIAL

## 2023-12-19 ENCOUNTER — TELEPHONE (OUTPATIENT)
Dept: FAMILY MEDICINE | Facility: CLINIC | Age: 83
End: 2023-12-19

## 2023-12-19 VITALS — DIASTOLIC BLOOD PRESSURE: 60 MMHG | HEART RATE: 82 BPM | SYSTOLIC BLOOD PRESSURE: 160 MMHG | OXYGEN SATURATION: 99 %

## 2023-12-19 DIAGNOSIS — I10 BENIGN ESSENTIAL HYPERTENSION: Primary | ICD-10-CM

## 2023-12-19 PROCEDURE — 99207 PR NO CHARGE NURSE ONLY: CPT

## 2023-12-19 NOTE — TELEPHONE ENCOUNTER
Dr Mullen  Pt arrives and reports blood pressure has been slowly increasing over last few weeks. Was 140's then 150's and then 160's. Today at home systolic was 178 so she scheduled bp check. She also reports a mild headache the last couple weeks. Not strong enough to take medication for.      1st bp 170/64 p 82  2nd bp 160/60 p 82     Pt taking meds as prescribed and not missing doses.   No extra stress or any pain.     Since pt's htn could be symptomatic with her headache I did put pt on schedule for tomorrow.   Need to know your recommendation-should pt be seen today in UC, ok to keep appt for tomorrow or would you prefer she make a medication change and follow up with you?        Zen Sandoval RN

## 2023-12-19 NOTE — TELEPHONE ENCOUNTER
Dr Mehta-POD    Provider did not respond to message. Would you be able to make a recommendation?      Zen Sandoval RN

## 2023-12-19 NOTE — TELEPHONE ENCOUNTER
Okay for same day appt tomorrow. If headache worsens or develops any concerning symptoms of vision changes, chest pain, shortness of breath, irregular heart rhythm then needs to present to the ED.

## 2023-12-19 NOTE — PROGRESS NOTES
Dr Mehta-pod    Provider did not respond to message. Would you make recommendation on this?      Zen Sandoval RN

## 2023-12-19 NOTE — TELEPHONE ENCOUNTER
Called pt and left message to keep appt for tomorrow. Be seen for worsening symptoms      Zen Sandoval RN

## 2023-12-20 ENCOUNTER — OFFICE VISIT (OUTPATIENT)
Dept: FAMILY MEDICINE | Facility: CLINIC | Age: 83
End: 2023-12-20
Payer: COMMERCIAL

## 2023-12-20 VITALS
TEMPERATURE: 97.4 F | HEIGHT: 61 IN | OXYGEN SATURATION: 99 % | BODY MASS INDEX: 20.77 KG/M2 | WEIGHT: 110 LBS | HEART RATE: 73 BPM | RESPIRATION RATE: 16 BRPM | DIASTOLIC BLOOD PRESSURE: 60 MMHG | SYSTOLIC BLOOD PRESSURE: 146 MMHG

## 2023-12-20 DIAGNOSIS — R79.89 ELEVATED SERUM CREATININE: Primary | ICD-10-CM

## 2023-12-20 DIAGNOSIS — I10 BENIGN ESSENTIAL HYPERTENSION: Primary | ICD-10-CM

## 2023-12-20 LAB
ANION GAP SERPL CALCULATED.3IONS-SCNC: 10 MMOL/L (ref 7–15)
BUN SERPL-MCNC: 27.8 MG/DL (ref 8–23)
CALCIUM SERPL-MCNC: 9.5 MG/DL (ref 8.8–10.2)
CHLORIDE SERPL-SCNC: 108 MMOL/L (ref 98–107)
CREAT SERPL-MCNC: 1.17 MG/DL (ref 0.51–0.95)
DEPRECATED HCO3 PLAS-SCNC: 24 MMOL/L (ref 22–29)
EGFRCR SERPLBLD CKD-EPI 2021: 46 ML/MIN/1.73M2
GLUCOSE SERPL-MCNC: 92 MG/DL (ref 70–99)
POTASSIUM SERPL-SCNC: 4.3 MMOL/L (ref 3.4–5.3)
SODIUM SERPL-SCNC: 142 MMOL/L (ref 135–145)

## 2023-12-20 PROCEDURE — 80048 BASIC METABOLIC PNL TOTAL CA: CPT | Performed by: NURSE PRACTITIONER

## 2023-12-20 PROCEDURE — 36415 COLL VENOUS BLD VENIPUNCTURE: CPT | Performed by: NURSE PRACTITIONER

## 2023-12-20 PROCEDURE — 99213 OFFICE O/P EST LOW 20 MIN: CPT | Performed by: NURSE PRACTITIONER

## 2023-12-20 RX ORDER — AMLODIPINE BESYLATE 10 MG/1
10 TABLET ORAL DAILY
Qty: 90 TABLET | Refills: 0 | Status: SHIPPED | OUTPATIENT
Start: 2023-12-20 | End: 2024-02-26

## 2023-12-20 RX ORDER — RESPIRATORY SYNCYTIAL VIRUS VACCINE 120MCG/0.5
0.5 KIT INTRAMUSCULAR ONCE
Qty: 1 EACH | Refills: 0 | Status: CANCELLED | OUTPATIENT
Start: 2023-12-20 | End: 2023-12-20

## 2023-12-20 ASSESSMENT — PAIN SCALES - GENERAL: PAINLEVEL: MILD PAIN (3)

## 2023-12-20 NOTE — PROGRESS NOTES
Assessment & Plan     Benign essential hypertension  Above goal, somewhat better today, however higher readings at home and with RN yesterday. Currently taking lisinopril-hydrochlorothiazide 20/25 twice daily and amlodipine 5mg daily. Mild elevation in creatinine today. Will increase amlodipine to 10mg daily. Will ask her to hydrate, repeat BMP in one week. PCP follow up in 2 weeks for recheck. May need to consider secondary cause of HTN such as renovascular HTN.  - Basic metabolic panel  (Ca, Cl, CO2, Creat, Gluc, K, Na, BUN); Future  - amLODIPine (NORVASC) 10 MG tablet; Take 1 tablet (10 mg) by mouth daily  - Basic metabolic panel  (Ca, Cl, CO2, Creat, Gluc, K, Na, BUN)       See Patient Instructions    SANDI Dotson [facility]    [place] is a 83 year old, presenting for the following health issues:  Hypertension        12/20/2023    10:07 AM   Additional Questions   Roomed by Martha GAMBLE   Accompanied by self       HPI       Hypertension Follow-up    Do you check your blood pressure regularly outside of the clinic? Yes   Are you following a low salt diet? Yes  Are your blood pressures ever more than 140 on the top number (systolic) OR more   than 90 on the bottom number (diastolic), for example 140/90? Yes  How many servings of fruits and vegetables do you eat daily?  2-3  On average, how many sweetened beverages do you drink each day (Examples: soda, juice, sweet tea, etc.  Do NOT count diet or artificially sweetened beverages)?   0  How many days per week do you exercise enough to make your heart beat faster? 5  How many minutes a day do you exercise enough to make your heart beat faster? 30 - 60  How many days per week do you miss taking your medication? 0    Above HPI reviewed. Additionally, patient has noted elevated blood pressures at home over the past month.  She was seen in November of last year, and at that time blood pressures were above goal.  She was  "taking Zestoretic 20-25 twice daily, and amlodipine 5 mg was added.  She had a previous BMP that was essentially normal and a normal TSH last year, so unlikely to have primary aldosteronism.  She was asked to follow-up in 2 weeks after the addition of amlodipine, and blood pressures were improved at recheck.  At home, she has been getting readings as high as 170 systolic, however have ranged from 150-170 over the last 2 weeks.  She has had mild headaches, but no chest pain, shortness of breath, exertional dyspnea, swelling of the extremities.        Review of Systems   Constitutional, HEENT, cardiovascular, pulmonary, gi and gu systems are negative, except as otherwise noted.      Objective    BP (!) 146/60   Pulse 73   Temp 97.4  F (36.3  C) (Tympanic)   Resp 16   Ht 1.549 m (5' 1\")   Wt 49.9 kg (110 lb)   LMP  (LMP Unknown)   SpO2 99%   BMI 20.78 kg/m    Body mass index is 20.78 kg/m .  Physical Exam  Vitals and nursing note reviewed.   Constitutional:       Appearance: Normal appearance.   HENT:      Head: Normocephalic and atraumatic.      Mouth/Throat:      Mouth: Mucous membranes are moist.   Eyes:      Comments: Non-icteric   Cardiovascular:      Rate and Rhythm: Normal rate and regular rhythm.      Pulses: Normal pulses.      Heart sounds: Normal heart sounds, S1 normal and S2 normal. Heart sounds not distant. No murmur heard.     No friction rub. No gallop.   Pulmonary:      Effort: Pulmonary effort is normal.      Breath sounds: Normal breath sounds.   Abdominal:      General: Abdomen is flat. Bowel sounds are normal.      Palpations: Abdomen is soft.   Musculoskeletal:      Cervical back: Neck supple.      Right lower leg: No edema.      Left lower leg: No edema.   Skin:     General: Skin is warm and dry.      Capillary Refill: Capillary refill takes less than 2 seconds.   Neurological:      General: No focal deficit present.      Mental Status: She is alert and oriented to person, place, and " time.   Psychiatric:         Mood and Affect: Mood normal.         Behavior: Behavior normal.         Thought Content: Thought content normal.         Judgment: Judgment normal.            Results for orders placed or performed in visit on 12/20/23 (from the past 24 hour(s))   Basic metabolic panel  (Ca, Cl, CO2, Creat, Gluc, K, Na, BUN)   Result Value Ref Range    Sodium 142 135 - 145 mmol/L    Potassium 4.3 3.4 - 5.3 mmol/L    Chloride 108 (H) 98 - 107 mmol/L    Carbon Dioxide (CO2) 24 22 - 29 mmol/L    Anion Gap 10 7 - 15 mmol/L    Urea Nitrogen 27.8 (H) 8.0 - 23.0 mg/dL    Creatinine 1.17 (H) 0.51 - 0.95 mg/dL    GFR Estimate 46 (L) >60 mL/min/1.73m2    Calcium 9.5 8.8 - 10.2 mg/dL    Glucose 92 70 - 99 mg/dL

## 2023-12-28 ENCOUNTER — LAB (OUTPATIENT)
Dept: LAB | Facility: CLINIC | Age: 83
End: 2023-12-28
Payer: COMMERCIAL

## 2023-12-28 DIAGNOSIS — R79.89 ELEVATED SERUM CREATININE: ICD-10-CM

## 2023-12-28 LAB
ANION GAP SERPL CALCULATED.3IONS-SCNC: 12 MMOL/L (ref 7–15)
BUN SERPL-MCNC: 28.1 MG/DL (ref 8–23)
CALCIUM SERPL-MCNC: 9.3 MG/DL (ref 8.8–10.2)
CHLORIDE SERPL-SCNC: 107 MMOL/L (ref 98–107)
CREAT SERPL-MCNC: 1.07 MG/DL (ref 0.51–0.95)
DEPRECATED HCO3 PLAS-SCNC: 20 MMOL/L (ref 22–29)
EGFRCR SERPLBLD CKD-EPI 2021: 52 ML/MIN/1.73M2
GLUCOSE SERPL-MCNC: 107 MG/DL (ref 70–99)
POTASSIUM SERPL-SCNC: 4.4 MMOL/L (ref 3.4–5.3)
SODIUM SERPL-SCNC: 139 MMOL/L (ref 135–145)

## 2023-12-28 PROCEDURE — 80048 BASIC METABOLIC PNL TOTAL CA: CPT

## 2023-12-28 PROCEDURE — 36415 COLL VENOUS BLD VENIPUNCTURE: CPT

## 2024-01-04 ENCOUNTER — OFFICE VISIT (OUTPATIENT)
Dept: FAMILY MEDICINE | Facility: CLINIC | Age: 84
End: 2024-01-04
Payer: COMMERCIAL

## 2024-01-04 VITALS
WEIGHT: 115 LBS | HEIGHT: 61 IN | OXYGEN SATURATION: 98 % | RESPIRATION RATE: 16 BRPM | SYSTOLIC BLOOD PRESSURE: 114 MMHG | DIASTOLIC BLOOD PRESSURE: 64 MMHG | BODY MASS INDEX: 21.71 KG/M2 | HEART RATE: 82 BPM | TEMPERATURE: 98 F

## 2024-01-04 DIAGNOSIS — I12.9 HYPERTENSIVE CHRONIC KIDNEY DISEASE WITH STAGE 1 THROUGH STAGE 4 CHRONIC KIDNEY DISEASE, OR UNSPECIFIED CHRONIC KIDNEY DISEASE: ICD-10-CM

## 2024-01-04 DIAGNOSIS — I10 BENIGN ESSENTIAL HYPERTENSION: ICD-10-CM

## 2024-01-04 DIAGNOSIS — I1A.0 RESISTANT HYPERTENSION: Primary | ICD-10-CM

## 2024-01-04 PROCEDURE — 99000 SPECIMEN HANDLING OFFICE-LAB: CPT | Performed by: STUDENT IN AN ORGANIZED HEALTH CARE EDUCATION/TRAINING PROGRAM

## 2024-01-04 PROCEDURE — 99214 OFFICE O/P EST MOD 30 MIN: CPT | Performed by: STUDENT IN AN ORGANIZED HEALTH CARE EDUCATION/TRAINING PROGRAM

## 2024-01-04 PROCEDURE — 36415 COLL VENOUS BLD VENIPUNCTURE: CPT | Performed by: STUDENT IN AN ORGANIZED HEALTH CARE EDUCATION/TRAINING PROGRAM

## 2024-01-04 PROCEDURE — 84244 ASSAY OF RENIN: CPT | Mod: 90 | Performed by: STUDENT IN AN ORGANIZED HEALTH CARE EDUCATION/TRAINING PROGRAM

## 2024-01-04 PROCEDURE — 82088 ASSAY OF ALDOSTERONE: CPT | Performed by: STUDENT IN AN ORGANIZED HEALTH CARE EDUCATION/TRAINING PROGRAM

## 2024-01-04 RX ORDER — HYDRALAZINE HYDROCHLORIDE 10 MG/1
10 TABLET, FILM COATED ORAL 4 TIMES DAILY
Qty: 360 TABLET | Refills: 0 | Status: SHIPPED | OUTPATIENT
Start: 2024-01-04 | End: 2024-04-04

## 2024-01-04 RX ORDER — RESPIRATORY SYNCYTIAL VIRUS VACCINE 120MCG/0.5
0.5 KIT INTRAMUSCULAR ONCE
Qty: 1 EACH | Refills: 0 | Status: CANCELLED | OUTPATIENT
Start: 2024-01-04 | End: 2024-01-04

## 2024-01-04 RX ORDER — LISINOPRIL AND HYDROCHLOROTHIAZIDE 20; 25 MG/1; MG/1
2 TABLET ORAL DAILY
Qty: 180 TABLET | Refills: 3 | Status: SHIPPED | OUTPATIENT
Start: 2024-01-04 | End: 2024-03-01

## 2024-01-04 RX ORDER — AMLODIPINE BESYLATE 10 MG/1
10 TABLET ORAL DAILY
Qty: 90 TABLET | Refills: 0 | Status: CANCELLED | OUTPATIENT
Start: 2024-01-04

## 2024-01-04 ASSESSMENT — PAIN SCALES - GENERAL: PAINLEVEL: NO PAIN (0)

## 2024-01-04 NOTE — PROGRESS NOTES
1. Resistant hypertension  > patient is on max dose of Zestoretic and Amlodipine, yet still continues to have elevated blood pressure readings when she is at home, given this there is a suspicion for secondary hypertension   > see documented home blood pressure readings below physical exam section of this note   - Aldosterone Renin Ratio; Future  - US Renal Artery with Kidney Left; Future  - US Renal Artery with Kidney Right; Future  - given her continued home readings, plan to also add on hydrALAZINE (APRESOLINE) 10 MG tablet; Take 1 tablet (10 mg) by mouth 4 times daily for 90 days  Dispense: 360 tablet; Refill: 0  - Aldosterone Renin Ratio  - refilled lisinopril-hydrochlorothiazide (ZESTORETIC) 20-25 MG tablet; Take 2 tablets by mouth daily  Dispense: 180 tablet; Refill: 3    3. 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, or unspecified chronic kidney disease  - US Renal Artery with Kidney Left; Future  - US Renal Artery with Kidney Right; Future      Subjective   Taina is a 83 year old, presenting for the following health issues:  Hypertension and Recheck Medication        1/4/2024    10:14 AM   Additional Questions   Roomed by Elen BARRETO LPN   Accompanied by self         1/4/2024    10:14 AM   Patient Reported Additional Medications   Patient reports taking the following new medications no new meds       History of Present Illness       Reason for visit:  Blood pressure      Hypertension Follow-up    Do you check your blood pressure regularly outside of the clinic? Yes   Are you following a low salt diet? Yes  Are your blood pressures ever more than 140 on the top number (systolic) OR more   than 90 on the bottom number (diastolic), for example 140/90? Qiv960/80 was the highest most recent BP taken on 12/23/23  How many servings of fruits and vegetables do you eat daily?  4 or more  On average, how many sweetened beverages do you drink each day (Examples: soda,  "juice, sweet tea, etc.  Do NOT count diet or artificially sweetened beverages)?   1  How many days per week do you exercise enough to make your heart beat faster? 5  How many minutes a day do you exercise enough to make your heart beat faster? 10 - 19  How many days per week do you miss taking your medication? 0    Medication Followup of Amlodipine 10mg daily  Taking Medication as prescribed: yes  Side Effects:  None  Medication Helping Symptoms:  yes  Medication Followup of Lisinopril-hydrochlorothiazide 20-25mg daily  Taking Medication as prescribed: yes  Side Effects:  None  Medication Helping Symptoms:  yes      Review of Systems   As above       Objective    BP (!) 144/52 (BP Location: Left arm, Patient Position: Sitting, Cuff Size: Adult Regular)   Pulse 82   Temp 98  F (36.7  C) (Tympanic)   Resp 16   Ht 1.549 m (5' 1\")   Wt 52.2 kg (115 lb)   LMP  (LMP Unknown)   SpO2 98%   BMI 21.73 kg/m    Body mass index is 21.73 kg/m .  Physical Exam  Constitutional:       General: She is not in acute distress.  HENT:      Head: Normocephalic and atraumatic.      Right Ear: External ear normal.      Left Ear: External ear normal.      Mouth/Throat:      Mouth: Mucous membranes are moist.      Pharynx: Oropharynx is clear. No oropharyngeal exudate or posterior oropharyngeal erythema.   Eyes:      Extraocular Movements: Extraocular movements intact.   Cardiovascular:      Rate and Rhythm: Normal rate and regular rhythm.      Heart sounds: Normal heart sounds.   Pulmonary:      Effort: Pulmonary effort is normal. No respiratory distress.      Breath sounds: Normal breath sounds. No wheezing or rhonchi.   Abdominal:      Palpations: Abdomen is soft. There is no mass.      Tenderness: There is no abdominal tenderness.   Musculoskeletal:         General: No deformity. Normal range of motion.      Cervical back: Normal range of motion and neck supple.   Skin:     General: Skin is warm.      Findings: No rash. "   Neurological:      General: No focal deficit present.      Mental Status: She is alert and oriented to person, place, and time.   Psychiatric:         Mood and Affect: Mood normal.      Patient's at home blood pressure readings:

## 2024-01-04 NOTE — LETTER
2024      Taina Jones  [address] [facility] [id number]        Dear ,    We are writing to inform you of your test results.    So far your labs are coming back within normal limits. If you have not already done so, please call [phone] to schedule an appointment for your ultrasound.     Resulted Orders   Aldosterone   Result Value Ref Range    Aldosterone 3.0 0.0 - 31.0 ng/dL   Renin activity   Result Value Ref Range    Renin Activity 17.1 ng/mL/hr      Comment:      INTERPRETIVE INFORMATION: Renin Activity    Adult, Normal sodium diet:    Supine ................. 0.2-1.6 ng/mL/hr    Upright ................ 0.5-4.0 ng/mL/hr    Children, Normal sodium diet, Supine:     (1-7 days) ..... 2.0-35.0 ng/mL/hr    Cord blood ............. 4.0-32.0 ng/mL/hr    1-12 mos ............... 2.4-37.0 ng/mL/hr    13 mos-3 yrs ........... 1.7-11.2 ng/mL/hr    4-5 yrs ................ 1.0- 6.5 ng/mL/hr    6-10 yrs ............... 0.5- 5.9 ng/mL/hr    11-15 yrs .............. 0.5- 3.3 ng/mL/hr    Children, normal sodium diet, Upright:    0-3 yrs ................ Not Available    4-5 yrs ................ Less than or equal to 15 ng/mL/hr    6-10 yrs ............... Less than or equal to 17 ng/mL/hr    11-15 yrs .............. Less than or equal to 16 ng/mL/hr    Plasma renin activity measures enzyme ability to convert   angiotensinogen to angiotensin I and is limited by the   availability of angiotensinogen. Plasma renin activity is   not an accurate indicator  of enzyme activity when   angiotensinogen is decreased.    This test was developed and its performance characteristics   determined by "[facility]. It has not been cleared or   approved by the [place] Food and Drug Administration. This test   was performed in a CLIA certified laboratory and is   intended for clinical purposes.  Performed By: "[facility]  [address] [place] [id number]  :  Sandip Hall MD, PhD  [facility] Number: [id number]   Aldosterone Renin Ratio   Result Value Ref Range    Aldosterone Renin Ratio 0.2 0.0 - 25.0       If you have any questions or concerns, please call the clinic at the number listed above.       Sincerely,      Francesca Mullen MD

## 2024-01-05 LAB — ALDOST SERPL-MCNC: 3 NG/DL (ref 0–31)

## 2024-01-07 LAB — RENIN PLAS-CCNC: 17.1 NG/ML/HR

## 2024-01-07 NOTE — RESULT ENCOUNTER NOTE
So far your labs are coming back within normal limits. If you have not already done so, please call [phone] to schedule an appointment for your ultrasound.     Sincerely,     Francesca Mullen MD

## 2024-01-08 LAB — ALDOST/RENIN PLAS-RTO: 0.2 {RATIO} (ref 0–25)

## 2024-01-24 ENCOUNTER — HOSPITAL ENCOUNTER (OUTPATIENT)
Dept: ULTRASOUND IMAGING | Facility: CLINIC | Age: 84
Discharge: HOME | End: 2024-01-24
Attending: STUDENT IN AN ORGANIZED HEALTH CARE EDUCATION/TRAINING PROGRAM | Admitting: STUDENT IN AN ORGANIZED HEALTH CARE EDUCATION/TRAINING PROGRAM
Payer: COMMERCIAL

## 2024-01-24 DIAGNOSIS — I1A.0 RESISTANT HYPERTENSION: ICD-10-CM

## 2024-01-24 DIAGNOSIS — I12.9 HYPERTENSIVE CHRONIC KIDNEY DISEASE WITH STAGE 1 THROUGH STAGE 4 CHRONIC KIDNEY DISEASE, OR UNSPECIFIED CHRONIC KIDNEY DISEASE: ICD-10-CM

## 2024-01-24 PROCEDURE — 93975 VASCULAR STUDY: CPT

## 2024-01-25 NOTE — RESULT ENCOUNTER NOTE
Hello,     Could someone please call the patient and have her schedule an appointment for follow-up on blood pressure in 1 month?     Thank you so much!   - MD Ninoska Roberts,     It was a pleasure speaking with you. I have received and reviewed your results, and have the following recommendations:     Good news, based on your ultrasound results, there were no findings consistent with renal artery stenosis. The radiologist did notice you have medical renal disease which is also corroborated by your creatinine and GFR values on blood work.       At this time, could you please check your blood pressure ranges at home daily or every other day and write them down on a log. Please make a follow-up appointment for blood pressure with me in the next month and bring your blood pressure log with you so I can see if there is anything else we can do to keep your blood pressure under control.     Sincerely,     Francesca Mullen MD

## 2024-02-26 ENCOUNTER — E-CONSULT (OUTPATIENT)
Dept: CARDIOLOGY | Facility: CLINIC | Age: 84
End: 2024-02-26
Payer: COMMERCIAL

## 2024-02-26 ENCOUNTER — OFFICE VISIT (OUTPATIENT)
Dept: FAMILY MEDICINE | Facility: CLINIC | Age: 84
End: 2024-02-26
Payer: COMMERCIAL

## 2024-02-26 VITALS
HEART RATE: 75 BPM | SYSTOLIC BLOOD PRESSURE: 118 MMHG | DIASTOLIC BLOOD PRESSURE: 42 MMHG | HEIGHT: 61 IN | BODY MASS INDEX: 21.5 KG/M2 | OXYGEN SATURATION: 99 % | WEIGHT: 113.9 LBS | TEMPERATURE: 98.2 F | RESPIRATION RATE: 20 BRPM

## 2024-02-26 DIAGNOSIS — R01.1 HEART MURMUR: ICD-10-CM

## 2024-02-26 DIAGNOSIS — I10 ESSENTIAL HYPERTENSION: Primary | ICD-10-CM

## 2024-02-26 LAB
CHOLEST SERPL-MCNC: 274 MG/DL
FASTING STATUS PATIENT QL REPORTED: NO
HDLC SERPL-MCNC: 103 MG/DL
LDLC SERPL CALC-MCNC: 156 MG/DL
NONHDLC SERPL-MCNC: 171 MG/DL
TRIGL SERPL-MCNC: 76 MG/DL
TSH SERPL DL<=0.005 MIU/L-ACNC: 1.24 UIU/ML (ref 0.3–4.2)

## 2024-02-26 PROCEDURE — 80061 LIPID PANEL: CPT | Performed by: STUDENT IN AN ORGANIZED HEALTH CARE EDUCATION/TRAINING PROGRAM

## 2024-02-26 PROCEDURE — 99451 NTRPROF PH1/NTRNET/EHR 5/>: CPT | Performed by: INTERNAL MEDICINE

## 2024-02-26 PROCEDURE — 84443 ASSAY THYROID STIM HORMONE: CPT | Performed by: STUDENT IN AN ORGANIZED HEALTH CARE EDUCATION/TRAINING PROGRAM

## 2024-02-26 PROCEDURE — 99207 E-CONSULT TO CARDIOLOGY (ADULT OUTPT PROVIDER TO SPECIALIST WRITTEN QUESTION & RESPONSE): CPT | Performed by: STUDENT IN AN ORGANIZED HEALTH CARE EDUCATION/TRAINING PROGRAM

## 2024-02-26 PROCEDURE — 99214 OFFICE O/P EST MOD 30 MIN: CPT | Performed by: STUDENT IN AN ORGANIZED HEALTH CARE EDUCATION/TRAINING PROGRAM

## 2024-02-26 PROCEDURE — 36415 COLL VENOUS BLD VENIPUNCTURE: CPT | Performed by: STUDENT IN AN ORGANIZED HEALTH CARE EDUCATION/TRAINING PROGRAM

## 2024-02-26 RX ORDER — RESPIRATORY SYNCYTIAL VIRUS VACCINE 120MCG/0.5
0.5 KIT INTRAMUSCULAR ONCE
Qty: 1 EACH | Refills: 0 | Status: CANCELLED | OUTPATIENT
Start: 2024-02-26 | End: 2024-02-26

## 2024-02-26 RX ORDER — AMLODIPINE BESYLATE 5 MG/1
5 TABLET ORAL DAILY
Qty: 90 TABLET | Refills: 0 | Status: SHIPPED | OUTPATIENT
Start: 2024-02-26 | End: 2024-04-04

## 2024-02-26 ASSESSMENT — PAIN SCALES - GENERAL: PAINLEVEL: NO PAIN (0)

## 2024-02-26 NOTE — PROGRESS NOTES
"  Assessment & Plan     Essential hypertension  > Patient's blood pressure was 116/38 on initial presentation, on recheck her blood pressure increase slightly to 118/42, she is currently on the maximum dose of lisinopril, hydrochlorothiazide, amlodipine  > of note, she did take her blood pressure medication earlier this morning and checks her home blood pressures at night at 8 PM, it is possible that perhaps her decreased blood pressure is just because her BP was obtained shortly after taking antihypertensives, but looking back at previous blood pressure readings, her last visit indicated that her blood pressure decreased from 146 down to 114 around the same time as today's appointment  -Given how low her diastolic blood pressure is, plan to decrease amlodipine from 10 mg to amLODIPine (NORVASC) 5 MG tablet; Take 1 tablet (5 mg) by mouth daily  -Given the lability of her blood pressure, will appreciate recommendations from cardiology, adult E-Consult to Cardiology (Outpt Provider to Specialist Written Question & Response)  - TSH with free T4 reflex  - Lipid panel reflex to direct LDL Non-fasting    Heart murmur  > murmur apprecaited on physical exam, patient states she has had a murmur for years   - Echocardiogram Complete; Future  - Lipid panel reflex to direct LDL Non-fasting; Future  - Lipid panel reflex to direct LDL Non-fasting      ADDENDUM:   [place] back from cardiology regarding patient's labile blood pressure readings, per their recommendations:   \"olmesartan/hydrochlorothiazide may be more effective for 24-hour blood pressure control\"   -Called patient and informed her of the update, she is aware and willing to try any medication while stopping the lisinopril hydrochlorothiazide   -Patient also aware if starts to notice any lightheadedness, dizziness, wooziness on the new medication, to check her blood pressure, if her blood pressure is consistently low (less than 110/70) then she can stop taking her " hydralazine  -Patient aware to schedule a 1 month follow-up with me to monitor her blood pressure    Subjective   Taina is a 83 year old, presenting for the following health issues:  Hypertension, Recheck Medication, and Health Maintenance (Declines vaccinations today)        2/26/2024     9:51 AM   Additional Questions   Roomed by Elen BARRETO LPN   Accompanied by self         2/26/2024     9:51 AM   Patient Reported Additional Medications   Patient reports taking the following new medications no new meds     History of Present Illness       Hypertension: She presents for follow up of hypertension.  She does check blood pressure  regularly outside of the clinic. Outside blood pressures have been over 140/90. (takes medication in the morning and has been checking pressures in the evening and getting numbers in the 170s/70s mostly) She follows a low salt diet.     She eats 0-1 servings of fruits and vegetables daily.She consumes 2 sweetened beverage(s) daily.She exercises with enough effort to increase her heart rate 20 to 29 minutes per day.  She exercises with enough effort to increase her heart rate 4 days per week.   She is taking medications regularly.     Medication Followup of amlodipine 10 mg daily  Taking Medication as prescribed: yes  Side Effects:  None  Medication Helping Symptoms:  yes    Denies weakness, dizziness, lightheadedness,   Does get tired easily in the afternoon after she is running around all morning taking care of kids and taking her  to his dr burr     Review of Systems   Constitutional:  Negative for chills and fever.   HENT:  Negative for ear pain.    Eyes:  Negative for pain.   Respiratory:  Negative for cough.    Cardiovascular:  Negative for chest pain.   Gastrointestinal:  Negative for abdominal pain.   Genitourinary:  Negative for dysuria.   Musculoskeletal:  Negative for neck pain.   Skin:  Negative for rash.   Neurological:  Negative for headaches.           Objective   "  BP (!) 116/38 (BP Location: Right arm, Patient Position: Sitting, Cuff Size: Adult Regular)   Pulse 75   Temp 98.2  F (36.8  C) (Tympanic)   Resp 20   Ht 1.56 m (5' 1.42\")   Wt 51.7 kg (113 lb 14.4 oz)   LMP  (LMP Unknown)   SpO2 99%   BMI 21.23 kg/m    Body mass index is 21.23 kg/m .  Physical Exam  Constitutional:       General: She is not in acute distress.  HENT:      Head: Normocephalic and atraumatic.      Right Ear: External ear normal.      Left Ear: External ear normal.      Mouth/Throat:      Mouth: Mucous membranes are moist.      Pharynx: Oropharynx is clear. No oropharyngeal exudate or posterior oropharyngeal erythema.   Eyes:      Extraocular Movements: Extraocular movements intact.   Cardiovascular:      Rate and Rhythm: Normal rate and regular rhythm.      Pulses: Normal pulses.      Heart sounds: Murmur heard.      Comments: Difficult to appreciate type of murmur, patient states she was told that she had a murmur after pregnancy years ago  Pulmonary:      Effort: Pulmonary effort is normal. No respiratory distress.      Breath sounds: Normal breath sounds. No wheezing or rhonchi.   Abdominal:      Palpations: Abdomen is soft. There is no mass.      Tenderness: There is no abdominal tenderness.   Musculoskeletal:         General: No deformity. Normal range of motion.      Cervical back: Normal range of motion and neck supple.   Skin:     General: Skin is warm.      Findings: No rash.   Neurological:      General: No focal deficit present.      Mental Status: She is alert and oriented to person, place, and time.   Psychiatric:         Mood and Affect: Mood normal.          Patient's blood pressure readings from her home blood pressure arm cuff:           Signed Electronically by: JEROMY BOSTON MD    "

## 2024-02-26 NOTE — LETTER
"February 28, 2024      Taina Jones  [address] [facility] [id number]        Dear ,    We are writing to inform you of your test results.    It is a pleasure providing you with medical care. I have received and reviewed your results, and have the following recommendations:     Your total cholesterol levels are elevated. What is especially concerning to me is the elevated LDL \"lousy\" cholesterol being elevated.  At this time no medication changes are needed however it is very important that you: Try to limit your dietary intake of fatty, greasy, oily, fried, take out, and fast food when possible. Also, I would suggest you cut back on red and processed meats, sodium and sugar-sweetened foods and beverages. Regarding exercise, please get at least 30 minutes of CONTINUOUS moderate intensity aerobic exercise at least 3 times per week.     The good news is your thyroid levels were within normal limits.     Resulted Orders   TSH with free T4 reflex   Result Value Ref Range    TSH 1.24 0.30 - 4.20 uIU/mL   Lipid panel reflex to direct LDL Non-fasting   Result Value Ref Range    Cholesterol 274 (H) <200 mg/dL    Triglycerides 76 <150 mg/dL    Direct Measure  >=50 mg/dL    LDL Cholesterol Calculated 156 (H) <=100 mg/dL    Non HDL Cholesterol 171 (H) <130 mg/dL    Patient Fasting > 8hrs? No     Narrative    Cholesterol  Desirable:  <200 mg/dL    Triglycerides  Normal:  Less than 150 mg/dL  Borderline High:  150-199 mg/dL  High:  200-499 mg/dL  Very High:  Greater than or equal to 500 mg/dL    Direct Measure HDL  Female:  Greater than or equal to 50 mg/dL   Male:  Greater than or equal to 40 mg/dL    LDL Cholesterol  Desirable:  <100mg/dL  Above Desirable:  100-129 mg/dL   Borderline High:  130-159 mg/dL   High:  160-189 mg/dL   Very High:  >= 190 mg/dL    Non HDL Cholesterol  Desirable:  130 mg/dL  Above Desirable:  130-159 mg/dL  Borderline High:  160-189 mg/dL  High:  190-219 " mg/dL  Very High:  Greater than or equal to 220 mg/dL       If you have any questions or concerns, please call the clinic at the number listed above.       Sincerely,      Francesca Mullen MD

## 2024-02-26 NOTE — PROGRESS NOTES
2/26/2024     E-Consult has been accepted.    Interprofessional consultation requested by:  Francesca Mullen MD      Clinical Question/Purpose: MY CLINICAL QUESTION IS: patient with labile blood pressure, she has low blood pressure in clinic (shortly after taking her antihypertensives) but then at the end of the day when she check her blood pressure using an at home arm cuff her blood pressure is often much greater than 140/90 (see documentation from notes on 2/26/24). Is this something that can be seen with renal artery stenosis? What would be a good way to manage the patient pharmaceutically as she is already on max dose on multiple antihypertensives but still having high blood pressure at night (checks at 20:00 daily)     Patient assessment and information reviewed:     Recommendations:    It appears that the recent renal ultrasound was not suggestive of renal artery stenosis.  I would recommend transitioning from lisinopril/hydrochlorothiazide to a combination of olmesartan/hydrochlorothiazide which may be more effective for 24-hour blood pressure control.  If this change does not lead to significant improvement in evening blood pressures, I would recommend adding an evening dose of 2.5 mg of amlodipine.  Thank you.      The recommendations provided in this E-Consult are based on a review of clinical data pertinent to the clinical question presented, without a review of the patient's complete medical record or, the benefit of a comprehensive in-person or virtual patient evaluation. This consultation should not replace the clinical judgement and evaluation of the provider ordering this E-Consult. Any new clinical issues, or changes in patient status since the filing of this E-Consult will need to be taken into account when assessing these recommendations. Please contact me if you have further questions.    My total time spent reviewing clinical information and formulating assessment was 10 minutes.        Blanca  Benny Rae MD

## 2024-02-27 NOTE — RESULT ENCOUNTER NOTE
"Hello could someone please mail these results/recommendations to the patient?     Ninoska Jones,     It is a pleasure providing you with medical care. I have received and reviewed your results, and have the following recommendations:     Your total cholesterol levels are elevated. What is especially concerning to me is the elevated LDL \"lousy\" cholesterol being elevated.  At this time no medication changes are needed however it is very important that you: Try to limit your dietary intake of fatty, greasy, oily, fried, take out, and fast food when possible. Also, I would suggest you cut back on red and processed meats, sodium and sugar-sweetened foods and beverages. Regarding exercise, please get at least 30 minutes of CONTINUOUS moderate intensity aerobic exercise at least 3 times per week.    The good news is your thyroid levels were within normal limits.    Sincerely,     Francesca Mullen MD      "

## 2024-03-01 RX ORDER — OLMESARTAN MEDOXOMIL AND HYDROCHLOROTHIAZIDE 40/25 40; 25 MG/1; MG/1
1 TABLET ORAL DAILY
Qty: 90 TABLET | Refills: 1 | Status: SHIPPED | OUTPATIENT
Start: 2024-03-01 | End: 2024-04-04

## 2024-03-05 ENCOUNTER — HOSPITAL ENCOUNTER (OUTPATIENT)
Dept: CARDIOLOGY | Facility: CLINIC | Age: 84
Discharge: HOME | End: 2024-03-05
Attending: STUDENT IN AN ORGANIZED HEALTH CARE EDUCATION/TRAINING PROGRAM | Admitting: STUDENT IN AN ORGANIZED HEALTH CARE EDUCATION/TRAINING PROGRAM
Payer: COMMERCIAL

## 2024-03-05 DIAGNOSIS — R01.1 HEART MURMUR: ICD-10-CM

## 2024-03-05 DIAGNOSIS — I10 ESSENTIAL HYPERTENSION: ICD-10-CM

## 2024-03-05 LAB — LVEF ECHO: NORMAL

## 2024-03-05 PROCEDURE — 93306 TTE W/DOPPLER COMPLETE: CPT | Mod: 26 | Performed by: INTERNAL MEDICINE

## 2024-03-05 PROCEDURE — 93306 TTE W/DOPPLER COMPLETE: CPT

## 2024-03-05 NOTE — RESULT ENCOUNTER NOTE
Ninoska Jones,     It is a pleasure providing you with medical care. I have received and reviewed your results, and have the following recommendations:     Good news, the ultrasound of your heart showed that your left ventricle is normal in size and you do not have any findings consistent with pericardial effusion (fluid around the heart).  Your ejection fraction is 60 to 65% which is within normal limits.  There were no regional wall motion abnormalities noted either.  You did have some mild blood flow regurgitation on the left side of your heart but this is not clinically significant and does not require any additional workup.      Sincerely,     Francesca Mullen MD

## 2024-03-05 NOTE — RESULT ENCOUNTER NOTE
Hello could someone please mail these results and recommendations to the patient?  Thank you so much! -Francesca Jones,    It is a pleasure providing you with medical care. I have received and reviewed your results, and have the following recommendations:    Good news, the ultrasound of your heart showed that your left ventricle is normal in size and you do not have any findings consistent with pericardial effusion (fluid around the heart).  Your ejection fraction is 60 to 65% which is within normal limits.  There were no regional wall motion abnormalities noted either.  You did have some mild blood flow regurgitation on the left side of your heart but this is not clinically significant and does not require any additional workup.      Sincerely,    Francesca Mullen MD

## 2024-03-05 NOTE — LETTER
2024      Trey Gomez  [address] [address] [address]        Dear ,    We are writing to inform you of your test results.    It is a pleasure providing you with medical care. I have received and reviewed your results, and have the following recommendations:      Good news, the ultrasound of your heart showed that your left ventricle is normal in size and you do not have any findings consistent with pericardial effusion (fluid around the heart).  Your ejection fraction is 60 to 65% which is within normal limits.  There were no regional wall motion abnormalities noted either.  You did have some mild blood flow regurgitation on the left side of your heart but this is not clinically significant and does not require any additional workup.     Resulted Orders   Echocardiogram Complete   Result Value Ref Range    LVEF  60-65%     Narrative    [id number]  [id number]  [id number]  [id number]^DARVIN^JEROMY     [facility]  Echocardiography Laboratory  [address] [place] [address]     Name: TREY GOMEZ  MRN: [id number]  : 1940  Study Date: 2024 10:46 AM  Age: 84 yrs  Gender: Female  Patient Location: [facility]  Reason For Study: Essential hypertension, Heart murmur  Ordering Physician: JEROMY BOSTON  Referring Physician: JEROMY BOSTON  Performed By: Lupe Kaur     BSA: 1.5 m2  Height: 61 in  Weight: 113 lb  HR: 72  BP: 157/66 mmHg  ______________________________________________________________________________  Procedure  Complete Echo Adult.  ______________________________________________________________________________  Interpretation Summary     1. The left ventricle is normal in size. There is normal left ventricular wall  thickness. Left ventricular systolic function is normal. The visual ejection  fraction is 60-65%. Diastolic Doppler findings (E/E' ratio and/or other  parameters) suggest left ventricular filling pressures are normal. No  regional  wall motion abnormalities noted.  2. The right ventricle is normal size. The right ventricular systolic function  is normal.  3. Mild mitral regurgitation.  4. Mild to moderate aortic regurgitation.  5. No pericardial effusion.  6. No previous study for comparison.  ______________________________________________________________________________  Left Ventricle  The left ventricle is normal in size. There is normal left ventricular wall  thickness. Left ventricular systolic function is normal. The visual ejection  fraction is 60-65%. Diastolic Doppler findings (E/E' ratio and/or other  parameters) suggest left ventricular filling pressures are normal. No regional  wall motion abnormalities noted.     Right Ventricle  The right ventricle is normal size. The right ventricular systolic function is  normal.     Atria  Normal left atrial size. Right atrial size is normal.     Mitral Valve  There is mild mitral annular calcification. There is mild (1+) mitral  regurgitation.     Tricuspid Valve  There is mild to moderate (1-2+) tricuspid regurgitation. The right  ventricular systolic pressure is approximated at 27.4 mmHg plus the right  atrial pressure.     Aortic Valve  There is mild trileaflet aortic sclerosis. There is mild to moderate (1-2+)  aortic regurgitation. No aortic stenosis is present.     Pulmonic Valve  There is mild (1+) pulmonic valvular regurgitation. There is no pulmonic  valvular stenosis.     Vessels  The aortic root is normal size. Normal size ascending aorta. The inferior vena  cava is normal.     Pericardium  There is no pericardial effusion.     Rhythm  Sinus rhythm was noted.  ______________________________________________________________________________  MMode/2D Measurements & Calculations  RVDd: 3.9 cm  IVSd: 0.97 cm     LVIDd: 5.0 cm  LVIDs: 3.5 cm  LVPWd: 0.63 cm  FS: 30.3 %  LV mass(C)d: 136.6 grams  LV mass(C)dI: 92.2 grams/m2  Ao root diam: 2.8 cm  LVOT diam: 1.8 cm  LVOT area:  2.5 cm2  Ao root diam index Ht(cm/m): 1.8  Ao root diam index BSA (cm/m2): 1.9  LA Volume (BP): 46.4 ml  LA Volume Index (BP): 31.4 ml/m2     LA Volume Indexed (AL/bp): 29.1 ml/m2  RWT: 0.25  TAPSE: 2.3 cm     Doppler Measurements & Calculations  MV E max saul: 59.7 cm/sec  MV A max saul: 92.2 cm/sec  MV E/A: 0.65  MV dec time: 0.20 sec  Ao V2 max: 189.0 cm/sec  Ao max P.0 mmHg  Ao V2 mean: 130.0 cm/sec  Ao mean P.0 mmHg  Ao V2 VTI: 41.7 cm  DEBORAH(I,D): 1.5 cm2  DEBORAH(V,D): 1.5 cm2  LV V1 max P.7 mmHg  LV V1 max: 108.0 cm/sec  LV V1 VTI: 25.1 cm  SV(LVOT): 63.9 ml  SI(LVOT): 43.1 ml/m2     PA V2 max: 102.0 cm/sec  PA max P.2 mmHg  TR max saul: 261.3 cm/sec  TR max P.4 mmHg  AV Saul Ratio (DI): 0.57  DEBORAH Index (cm2/m2): 1.0  E/E' av.1  Lateral E/e': 6.8  Medial E/e': 7.5  RV S Saul: 14.5 cm/sec     ______________________________________________________________________________  Report approved by: James Ho 2024 01:10 PM             If you have any questions or concerns, please call the clinic at the number listed above.       Sincerely,      Francesca Mullen MD

## 2024-04-04 ENCOUNTER — OFFICE VISIT (OUTPATIENT)
Dept: FAMILY MEDICINE | Facility: CLINIC | Age: 84
End: 2024-04-04
Payer: COMMERCIAL

## 2024-04-04 VITALS
WEIGHT: 114.2 LBS | BODY MASS INDEX: 21.56 KG/M2 | TEMPERATURE: 98.1 F | RESPIRATION RATE: 20 BRPM | HEART RATE: 77 BPM | DIASTOLIC BLOOD PRESSURE: 68 MMHG | HEIGHT: 61 IN | OXYGEN SATURATION: 99 % | SYSTOLIC BLOOD PRESSURE: 134 MMHG

## 2024-04-04 DIAGNOSIS — I1A.0 RESISTANT HYPERTENSION: ICD-10-CM

## 2024-04-04 DIAGNOSIS — Z00.00 ROUTINE GENERAL MEDICAL EXAMINATION AT A HEALTH CARE FACILITY: Primary | ICD-10-CM

## 2024-04-04 PROCEDURE — 99214 OFFICE O/P EST MOD 30 MIN: CPT | Mod: 25 | Performed by: STUDENT IN AN ORGANIZED HEALTH CARE EDUCATION/TRAINING PROGRAM

## 2024-04-04 PROCEDURE — G0438 PPPS, INITIAL VISIT: HCPCS | Performed by: STUDENT IN AN ORGANIZED HEALTH CARE EDUCATION/TRAINING PROGRAM

## 2024-04-04 RX ORDER — RESPIRATORY SYNCYTIAL VIRUS VACCINE 120MCG/0.5
0.5 KIT INTRAMUSCULAR ONCE
Qty: 1 EACH | Refills: 0 | Status: CANCELLED | OUTPATIENT
Start: 2024-04-04 | End: 2024-04-04

## 2024-04-04 RX ORDER — OLMESARTAN MEDOXOMIL AND HYDROCHLOROTHIAZIDE 40/25 40; 25 MG/1; MG/1
1 TABLET ORAL DAILY
Qty: 90 TABLET | Refills: 1 | Status: SHIPPED | OUTPATIENT
Start: 2024-04-04

## 2024-04-04 RX ORDER — AMLODIPINE BESYLATE 10 MG/1
10 TABLET ORAL DAILY
Qty: 90 TABLET | Refills: 3 | Status: SHIPPED | OUTPATIENT
Start: 2024-04-04

## 2024-04-04 RX ORDER — HYDRALAZINE HYDROCHLORIDE 10 MG/1
10 TABLET, FILM COATED ORAL 4 TIMES DAILY
Qty: 360 TABLET | Refills: 0 | Status: SHIPPED | OUTPATIENT
Start: 2024-04-04 | End: 2024-06-24

## 2024-04-04 ASSESSMENT — PAIN SCALES - GENERAL: PAINLEVEL: NO PAIN (0)

## 2024-04-04 NOTE — PROGRESS NOTES
Assessment & Plan     Routine general medical examination at a health care facility  > Patient is aged out of Pap screenings, she declined mammograms, has not had a history of smoking, declined colonoscopy screening  -Discussed with patient option for DEXA scan screening, she declined at today's visit, supportive care measures including using calcium and vitamin D supplements as well as with assistance/weight based training exercises provided at today's visit    Resistant hypertension, still not at goal of less than 140/90  > On repeat BP check, her blood pressure dropped down to 130/68  -Shared decision-making was had, patient is amenable to increasing her amlodipine from 5 mg to 10 mg given that she continues to have very fluctuating blood pressure ranges at home (see HPI below)  - amLODIPine (NORVASC) 10 MG tablet; Take 1 tablet (10 mg) by mouth daily  - olmesartan-hydrochlorothiazide (BENICAR HCT) 40-25 MG tablet; Take 1 tablet by mouth daily  - hydrALAZINE (APRESOLINE) 10 MG tablet; Take 1 tablet (10 mg) by mouth 4 times daily    Follow-up plan:   - have patient return to clinic in 3 months or sooner if her blood pressure continues to remain poorly controlled, can consider adding on an additional hydrochlorothiazide 25 mg to her current antihypertensive regimen    [place] Her is a 84 year old, presenting for the following health issues:  Recheck Medication, Hypertension, and Physical        4/4/2024    10:43 AM   Additional Questions   Roomed by Elen BARRETO LPN   Accompanied by self         4/4/2024    10:43 AM   Patient Reported Additional Medications   Patient reports taking the following new medications no new meds     History of Present Illness       Reason for visit:  Medications        Hypertension Follow-up  Do you check your blood pressure regularly outside of the clinic? Yes   Are you following a low salt diet? Yes  Are your blood pressures ever more than 140 on the top number (systolic) OR  more   than 90 on the bottom number (diastolic), for example 140/90? Yes 150/80 range or so  How many days per week do you miss taking your medication? 0  Medication Followup of Amlodipine 5 mg daily  Taking Medication as prescribed: yes  Side Effects:  None  Medication Helping Symptoms:  yes  Medication Followup of olmesartan-hydrochlorothiazide 40-25 mg daily  Taking Medication as prescribed: yes  Side Effects:  None  Medication Helping Symptoms:  yes     Medication Followup of hydralazine 10 mg QID  Taking Medication as prescribed: yes  Side Effects:  None  Medication Helping Symptoms:  yes    Blood pressure ranges are still variable ranging from SBP of 130s-160s her DBP is staying stable in the 70s-80s    Annual Wellness Visit     Patient has been advised of split billing requirements and indicates understanding: Yes          Health Care Directive  Patient does not have a Health Care Directive or Living Will: Patient states has Advance Directive and will bring in a copy to clinic.  In general, how would you rate your overall physical health? good  Do you have a special diet?  Low salt  Do you see a dentist two times every year?  (!) NO  The patient was instructed to see the dentist every 6 months.  Only goes once yearly  Have you been more tired than usual lately?  No  If you drink alcohol do you typically have >3 drinks per day or >7 drinks per week? No  Do you have a current opioid prescription? No  Do you use any other controlled substances or medications that are not prescribed by a provider? None  Social History     Tobacco Use    Smoking status: Former    Smokeless tobacco: Never   Vaping Use    Vaping Use: Never used   Substance Use Topics    Alcohol use: Yes     Comment: occasional 4-5 drinks per month    Drug use: No       Needs assistance for the following daily activities: no assistance needed  Which of the following safety concerns are present in your home?  none identified   Do you (or your family  members) have any concerns about your safety while driving?  No  Do you have any of the following hearing concerns?: (!) I FEEL THAT PEOPLE ARE MUMBLING OR NOT SPEAKING CLEARLY and (!) I NEED TO ASK PEOPLE TO SPEAK UP OR REPEAT THEMSELVES  - declines hearing screening today  In the past 6 months, have you been bothered by leaking of urine? No        4/4/2024   Social Factors   Worry food won't last until get money to buy more Patient declined   Food not last or not have enough money for food? No   Do you have housing?  Patient declined   Are you worried about losing your housing? Patient declined   Lack of transportation? Patient declined   Unable to get utilities (heat,electricity)? Patient declined          4/4/2024   Fall Risk   Fallen 2 or more times in the past year? No   Trouble with walking or balance? No      Today's PHQ-2 Score:       4/4/2024    10:54 AM   PHQ-2 ( 1999 [employer])   Q1: Little interest or pleasure in doing things 0   Q2: Feeling down, depressed or hopeless 0   PHQ-2 Score 0        Mammogram Screening - After age 74- determine frequency with patient based on health status, life expectancy and patient goals      History of abnormal Pap smear: NO - age 65 - see link Cervical Cytology Screening Guidelines           Reviewed and updated as needed this visit by Provider                    Current providers sharing in care for this patient include:  Patient Care Team:  No Ref-Primary, Physician as PCP - Francesca Amezcua MD as Assigned PCP    The following health maintenance items are reviewed in Epic and correct as of today:  Health Maintenance   Topic Date Due    DEXA  Never done    ADVANCE CARE PLANNING  Never done    DTAP/TDAP/TD IMMUNIZATION (1 - Tdap) Never done    ZOSTER IMMUNIZATION (1 of 2) Never done    RSV VACCINE (Pregnancy & 60+) (1 - 1-dose 60+ series) Never done    MEDICARE ANNUAL WELLNESS VISIT  Never done    Pneumococcal Vaccine: 65+ Years (1 of 1 - PCV) Never done    INFLUENZA  "VACCINE (1) Never done    COVID-19 Vaccine (3 - 2023-24 season) 09/01/2023    ANNUAL REVIEW OF HM ORDERS  01/04/2025    FALL RISK ASSESSMENT  04/04/2025    PHQ-2 (once per calendar year)  Completed    IPV IMMUNIZATION  Aged Out    HPV IMMUNIZATION  Aged Out    MENINGITIS IMMUNIZATION  Aged Out    RSV MONOCLONAL ANTIBODY  Aged Out       Appropriate preventive services were discussed with this patient, including applicable screening as appropriate for fall prevention, nutrition, physical activity, Tobacco-use cessation, weight loss and cognition.  Checklist reviewing preventive services available has been given to the patient.           4/4/2024   Mini Cog   Clock Draw Score 2 Normal   3 Item Recall 3 objects recalled   Mini Cog Total Score 5              Review of Systems   Constitutional:  Negative for chills and fever.   HENT:  Negative for ear pain.    Eyes:  Negative for pain.   Respiratory:  Negative for cough.    Cardiovascular:  Negative for chest pain.   Gastrointestinal:  Negative for abdominal pain.   Genitourinary:  Negative for dysuria.   Musculoskeletal:  Negative for neck pain.   Skin:  Negative for rash.   Neurological:  Negative for headaches.         Objective    BP (!) 142/64 (BP Location: Left arm, Patient Position: Sitting, Cuff Size: Adult Regular)   Pulse 77   Temp 98.1  F (36.7  C) (Tympanic)   Resp 20   Ht 1.55 m (5' 1.02\")   Wt 51.8 kg (114 lb 3.2 oz)   LMP  (LMP Unknown)   SpO2 99%   BMI 21.56 kg/m    Body mass index is 21.56 kg/m .  Physical Exam  Constitutional:       General: She is not in acute distress.     Appearance: Normal appearance.   HENT:      Head: Normocephalic and atraumatic.      Right Ear: External ear normal.      Left Ear: External ear normal.   Eyes:      General: No scleral icterus.        Right eye: No discharge.         Left eye: No discharge.      Extraocular Movements: Extraocular movements intact.      Conjunctiva/sclera: Conjunctivae normal.   Pulmonary:    "   Effort: Pulmonary effort is normal. No respiratory distress.   Musculoskeletal:         General: No deformity. Normal range of motion.      Cervical back: Normal range of motion and neck supple.   Skin:     General: Skin is warm.      Comments: No rash on exposed skin   Neurological:      General: No focal deficit present.      Mental Status: She is alert and oriented to person, place, and time.   Psychiatric:         Mood and Affect: Mood normal.         Behavior: Behavior normal.              Signed Electronically by: JEROMY BOSTON MD

## 2024-04-04 NOTE — PATIENT INSTRUCTIONS
Hello! It was a pleasure seeing you today. Just some things we discussed at today's visit:     Blood pressure    Still somewhat variable in range, plan to increase your amlodipine from 5mg to 10mg   If your blood pressure is still increased despite raising the dose of amlodipine from 5mg to 10mg, we may have to consider adding on an additional hydrochlorothiazide 25mg   You are ok to continue taking your hydralazine as prescribed   Hydration  Aim for 64oz water intake daily       Sincerely,     Francesca Mullen MD

## 2024-06-24 DIAGNOSIS — I1A.0 RESISTANT HYPERTENSION: ICD-10-CM

## 2024-06-24 RX ORDER — HYDRALAZINE HYDROCHLORIDE 10 MG/1
10 TABLET, FILM COATED ORAL 4 TIMES DAILY
Qty: 360 TABLET | Refills: 1 | Status: SHIPPED | OUTPATIENT
Start: 2024-06-24

## 2025-04-22 ENCOUNTER — TELEPHONE (OUTPATIENT)
Dept: FAMILY MEDICINE | Facility: CLINIC | Age: 85
End: 2025-04-22
Payer: COMMERCIAL

## 2025-04-22 NOTE — LETTER
April 22, 2025    Taina Jones    [address] [address] [address]    Hello,     Your care team at [facility] values your health and well-being. After reviewing your chart, we have identified recommendation(s) to help you better manage your health.    It's time for your Annual Wellness visit. During your visit, we'll discuss your health, well-being, and any questions you may have related to preventive care. We'll also review any recommended tests, exams, or screenings you might need. To schedule please call your clinic at [phone] or use your [employer] account. It's time for a follow-up visit to manage your Hypertension as well.    If you recently had or are having any of these services soon, please contact the clinic via phone or [employer] so that your care team can update your records.    We look forward to seeing you at your upcoming visit.    If you have any questions or concerns, please contact our clinic. Thank you for continuing to trust us with your care.    Sincerely,    Your [facility] Care Team         Electronically signed

## 2025-04-22 NOTE — TELEPHONE ENCOUNTER
Patient Quality Outreach    Patient is due for the following:   Hypertension -  BP check  Physical Annual Wellness Visit    Action(s) Taken:   Schedule a Annual Wellness Visit    Type of outreach:    Sent letter.    Questions for provider review:    None         Elen BARRETO LPN  Chart routed to None.